# Patient Record
Sex: FEMALE | Race: OTHER | HISPANIC OR LATINO | Employment: FULL TIME | URBAN - METROPOLITAN AREA
[De-identification: names, ages, dates, MRNs, and addresses within clinical notes are randomized per-mention and may not be internally consistent; named-entity substitution may affect disease eponyms.]

---

## 2018-02-22 ENCOUNTER — TREATMENT (OUTPATIENT)
Dept: PHYSICAL THERAPY | Facility: CLINIC | Age: 28
End: 2018-02-22

## 2018-02-22 DIAGNOSIS — Z02.1 PRE-EMPLOYMENT EXAMINATION: Primary | ICD-10-CM

## 2018-02-22 PROCEDURE — PDS: Performed by: PHYSICAL THERAPIST

## 2019-10-07 ENCOUNTER — OFFICE VISIT (OUTPATIENT)
Dept: OBSTETRICS AND GYNECOLOGY | Facility: CLINIC | Age: 29
End: 2019-10-07

## 2019-10-07 VITALS
BODY MASS INDEX: 34.66 KG/M2 | SYSTOLIC BLOOD PRESSURE: 126 MMHG | DIASTOLIC BLOOD PRESSURE: 68 MMHG | HEIGHT: 69 IN | WEIGHT: 234 LBS

## 2019-10-07 DIAGNOSIS — Z12.4 SCREENING FOR CERVICAL CANCER: Primary | ICD-10-CM

## 2019-10-07 DIAGNOSIS — Z30.432 ENCOUNTER FOR IUD REMOVAL: ICD-10-CM

## 2019-10-07 PROCEDURE — 58301 REMOVE INTRAUTERINE DEVICE: CPT | Performed by: OBSTETRICS & GYNECOLOGY

## 2019-10-07 PROCEDURE — 87624 HPV HI-RISK TYP POOLED RSLT: CPT | Performed by: OBSTETRICS & GYNECOLOGY

## 2019-10-07 PROCEDURE — 99203 OFFICE O/P NEW LOW 30 MIN: CPT | Performed by: OBSTETRICS & GYNECOLOGY

## 2019-10-07 PROCEDURE — G0123 SCREEN CERV/VAG THIN LAYER: HCPCS | Performed by: OBSTETRICS & GYNECOLOGY

## 2019-10-07 RX ORDER — ALBUTEROL SULFATE 0.63 MG/3ML
1 SOLUTION RESPIRATORY (INHALATION)
COMMUNITY

## 2019-10-07 NOTE — PROGRESS NOTES
Subjective   Myrtle Hurst is a 29 y.o. female  YOB: 1990        Chief Complaint   Patient presents with   • Contraception     Pt is here for the removal of her IUD        29 year old female  LMP 10 years ago presents for removal of IUD. Patient reports that her last annual examinations was several years ago. She has had her Mirena in for five years. Reports her last PAP smear was 5 years ago and was normal as per patient. She reports occasional ETOH. She is interested in becoming pregnant. She is currently  to her wife and is interested in insemination. She reports that she has not had a menstrual cycle since having her Mirena placed. She reports occasional ETOH.           No Known Allergies    History reviewed. No pertinent past medical history.    Family History   Problem Relation Age of Onset   • Heart disease Father    • Kidney cancer Father    • Stroke Mother    • Hypertension Mother    • Colon cancer Mother    • Melanoma Mother    • No Known Problems Brother    • No Known Problems Sister    • No Known Problems Brother    • Ovarian cancer Sister        Social History     Socioeconomic History   • Marital status:      Spouse name: Not on file   • Number of children: Not on file   • Years of education: Not on file   • Highest education level: Not on file   Tobacco Use   • Smoking status: Never Smoker   • Smokeless tobacco: Never Used   Substance and Sexual Activity   • Alcohol use: Yes     Comment: occasional    • Drug use: No   • Sexual activity: Yes     Partners: Female     Birth control/protection: IUD         Current Outpatient Medications:   •  albuterol (ACCUNEB) 0.63 MG/3ML nebulizer solution, 1 ampule., Disp: , Rfl:     No LMP recorded. Patient has had an implant.    Sexual History:         Could not be calculated    Past Surgical History:   Procedure Laterality Date   • BREAST BIOPSY     • TONSILLECTOMY         Review of Systems   Constitutional: Negative for  "activity change and unexpected weight loss.   HENT: Negative for congestion.    Cardiovascular: Negative for chest pain.   Gastrointestinal: Negative for blood in stool, constipation and diarrhea.   Endocrine: Negative for cold intolerance and heat intolerance.   Genitourinary: Positive for amenorrhea (Mirena in place). Negative for dyspareunia, pelvic pain and vaginal discharge.   Musculoskeletal: Negative for arthralgias, back pain, neck pain and neck stiffness.   Skin: Negative for rash.   Neurological: Negative for dizziness and headache.   Psychiatric/Behavioral: Negative for sleep disturbance. The patient is not nervous/anxious.        Objective   Physical Exam   Constitutional: She appears well-developed and well-nourished. No distress.   HENT:   Head: Normocephalic and atraumatic.   Neck: Normal range of motion. No thyromegaly present.   Cardiovascular: Normal rate and regular rhythm.   No murmur heard.  Pulmonary/Chest: Effort normal. She has no wheezes. She has no rales.   Abdominal: Soft. There is no tenderness.   Genitourinary: Vagina normal and cervix normal. Pelvic exam was performed with patient supine. There is no tenderness or lesion on the right labia. There is no tenderness or lesion on the left labia. Uterus is not enlarged or tender. Cervix does not exhibit motion tenderness, discharge or friability. Right adnexum displays no tenderness and no fullness. Left adnexum displays no tenderness and no fullness. No tenderness or bleeding in the vagina. No signs of injury around the vagina. No vaginal discharge found.   Genitourinary Comments: Consent obtained to remove IUD at this time   Strings visible. Grasp with ringed forceps. Removed without difficulty.      Nursing note and vitals reviewed.        Vitals:    10/07/19 1413   BP: 126/68   Weight: 106 kg (234 lb)   Height: 175.3 cm (69\")       Myrtle was seen today for contraception.    Diagnoses and all orders for this visit:    Screening for " cervical cancer  -     Liquid-based Pap Smear, Screening    Encounter for IUD removal  -     Liquid-based Pap Smear, Screening    -PAP smear collected today   -IUD removed without difficulty   -Counseled patient to track cycles and RTC In 3 months to discuss cycles   -Patient interested in IUI. Given information on [facility].     Lucila Noguera, DO

## 2019-10-10 LAB
GEN CATEG CVX/VAG CYTO-IMP: NORMAL
HPV I/H RISK 4 DNA CVX QL PROBE+SIG AMP: NOT DETECTED
LAB AP CASE REPORT: NORMAL
LAB AP GYN ADDITIONAL INFORMATION: NORMAL
LAB AP GYN OTHER FINDINGS: NORMAL
PATH INTERP SPEC-IMP: NORMAL
STAT OF ADQ CVX/VAG CYTO-IMP: NORMAL

## 2020-07-10 ENCOUNTER — OFFICE VISIT (OUTPATIENT)
Dept: OBSTETRICS AND GYNECOLOGY | Facility: CLINIC | Age: 30
End: 2020-07-10

## 2020-07-10 VITALS
WEIGHT: 238 LBS | HEIGHT: 69 IN | DIASTOLIC BLOOD PRESSURE: 70 MMHG | BODY MASS INDEX: 35.25 KG/M2 | SYSTOLIC BLOOD PRESSURE: 128 MMHG

## 2020-07-10 DIAGNOSIS — Z31.9 INFERTILITY MANAGEMENT: Primary | ICD-10-CM

## 2020-07-10 PROCEDURE — 99213 OFFICE O/P EST LOW 20 MIN: CPT | Performed by: OBSTETRICS & GYNECOLOGY

## 2020-07-23 ENCOUNTER — TELEPHONE (OUTPATIENT)
Dept: OBSTETRICS AND GYNECOLOGY | Facility: CLINIC | Age: 30
End: 2020-07-23

## 2020-08-04 ENCOUNTER — PROCEDURE VISIT (OUTPATIENT)
Dept: OBSTETRICS AND GYNECOLOGY | Facility: CLINIC | Age: 30
End: 2020-08-04

## 2020-08-04 VITALS
WEIGHT: 238 LBS | BODY MASS INDEX: 35.25 KG/M2 | DIASTOLIC BLOOD PRESSURE: 78 MMHG | SYSTOLIC BLOOD PRESSURE: 124 MMHG | HEIGHT: 69 IN

## 2020-08-04 DIAGNOSIS — Z31.9 PATIENT DESIRES PREGNANCY: ICD-10-CM

## 2020-08-04 PROCEDURE — 58322 ARTIFICIAL INSEMINATION: CPT | Performed by: OBSTETRICS & GYNECOLOGY

## 2020-08-04 NOTE — PROGRESS NOTES
Myrtle Hurst is a 29 y.o. female here today for intrauterine insemination.  She was previously seen and evaluated by US on Monday morning with US and triggered with hcg.  She is on ovulation induction.  She has doesn't have a history of inferility but desires pregnancy with her wife. The have elected to use donor sperm and had it shipped to their house. This is insemination #1.    The patient was placed in the lithotomy position on the exam table.  The cervix was visualized with a speculum.  The cervix was probed and found to be patent.  I have reviewed the slide prepared from the semen washings.  There are numerous mobile sperm noted in a fairly high concentration.  The prepared semen sample, of approximately 0.5cc, was drawn up in an insemination catheter and the catheter placed transcervically to the uterine fundus.  The entire sample was placed at the uterine fundus without difficulty.  The catheter and speculum were removed.  The patient was allowed to stay in a hips elevated position for 20 minutes prior to leaving the office.  She will notify the office in a couple of weeks if she has a period or a positive pregnancy test.

## 2021-07-06 NOTE — PROGRESS NOTES
"Subjective   Myrtle Hurst is a 29 y.o. female.     Chief Complaint   Patient presents with   • Possible Pregnancy     PT is here to discuss IUI       29 year old female  Patient's last menstrual period was 2020 (exact date) presents for follow up management on infertility. Patient was previously seen and evaluated for removal of her Mirena IUD since she desired to have a baby. She has been tracking her cycles and reports that they are regular lasting approximately 5 days and the last day is light. She and her partner have looked up sperm [facility] and reports that they have a membership. She voices no other complaints at this time.          Review of Systems   Constitutional: Negative for chills and fever.   Genitourinary: Negative for menstrual problem and vaginal bleeding.       Objective   /70   Ht 175.3 cm (69\")   Wt 108 kg (238 lb)   LMP 2020 (Exact Date)   BMI 35.15 kg/m²   Patient's last menstrual period was 2020 (exact date).  Physical Exam   Constitutional: She is oriented to person, place, and time. She appears well-developed and well-nourished. No distress.   HENT:   Head: Normocephalic and atraumatic.   Eyes: Conjunctivae are normal. Right eye exhibits no discharge. Left eye exhibits no discharge.   Neck: Normal range of motion.   Pulmonary/Chest: Effort normal.   Musculoskeletal: Normal range of motion.   Neurological: She is alert and oriented to person, place, and time.   Skin: Skin is warm and dry.   Psychiatric: She has a normal mood and affect. Her behavior is normal. Judgment normal.   Nursing note and vitals reviewed.    Assessment/Plan   Problems Addressed this Visit     None      Visit Diagnoses     Infertility management    -  Primary      -Discussed risk, benefits to placing patient on Clomid at this time. Risk including increased risk for multiples.   -Discussed with patient she should take Clomid days 3-7 follwed by US at day 10 with IUI insemination 36 " hours after she has received the hcg trigger shot.   -RTC for US surveillance   -All questions answered and patient verbalized understanding of plan.        Lucila Noguera, DO   21-Jun-2021 01:50

## 2021-08-27 ENCOUNTER — OFFICE VISIT (OUTPATIENT)
Dept: OBSTETRICS AND GYNECOLOGY | Facility: CLINIC | Age: 31
End: 2021-08-27

## 2021-08-27 VITALS
DIASTOLIC BLOOD PRESSURE: 78 MMHG | BODY MASS INDEX: 32.88 KG/M2 | HEIGHT: 69 IN | SYSTOLIC BLOOD PRESSURE: 122 MMHG | WEIGHT: 222 LBS

## 2021-08-27 DIAGNOSIS — Z31.9 PATIENT DESIRES PREGNANCY: Primary | ICD-10-CM

## 2021-08-27 DIAGNOSIS — Z31.9 INFERTILITY MANAGEMENT: ICD-10-CM

## 2021-08-27 PROCEDURE — 99213 OFFICE O/P EST LOW 20 MIN: CPT | Performed by: OBSTETRICS & GYNECOLOGY

## 2021-08-27 RX ORDER — PRENATAL VIT NO.126/IRON/FOLIC 28MG-0.8MG
TABLET ORAL DAILY
COMMUNITY
End: 2022-09-27

## 2021-08-27 RX ORDER — ERGOCALCIFEROL (VITAMIN D2) 10 MCG
400 TABLET ORAL DAILY
COMMUNITY
End: 2022-09-27

## 2021-08-27 RX ORDER — CETIRIZINE HYDROCHLORIDE 10 MG/1
10 TABLET ORAL DAILY
COMMUNITY

## 2021-08-27 NOTE — PROGRESS NOTES
"Liliana Hurst is a 30 y.o. female.     Chief Complaint   Patient presents with   • Infertility     pt here to discuss IUI planning on using [facility], currently on CD 9     30-year-old female  2 para 1-0-1-1 last menstrual period 2021 presents to further discuss infertility management.  Patient previously been seen and evaluated in August of last year and underwent 1 IUI.  She had previously gotten sperm from the [facility].  She reports her cycles are regular lasting approximately 4 days.  She denies any changes to her medical or surgical history.  Her medications and allergies are up-to-date.  She voices no new concerns at this time.         Review of Systems   Genitourinary: Negative for menstrual problem and vaginal bleeding.       Objective   /78 (BP Location: Left arm, Patient Position: Sitting, Cuff Size: Adult)   Ht 175.3 cm (69\")   Wt 101 kg (222 lb)   LMP 2021 (Exact Date)   Breastfeeding No   BMI 32.78 kg/m²   Patient's last menstrual period was 2021 (exact date).  Physical Exam  Vitals and nursing note reviewed.   Constitutional:       General: She is not in acute distress.     Appearance: She is well-developed.   HENT:      Head: Normocephalic and atraumatic.   Eyes:      General:         Right eye: No discharge.         Left eye: No discharge.      Conjunctiva/sclera: Conjunctivae normal.   Pulmonary:      Effort: Pulmonary effort is normal.   Musculoskeletal:         General: Normal range of motion.      Cervical back: Normal range of motion and neck supple.   Skin:     General: Skin is warm and dry.   Neurological:      Mental Status: She is alert and oriented to person, place, and time.   Psychiatric:         Behavior: Behavior normal.         Judgment: Judgment normal.       Assessment/Plan   Problems Addressed this Visit     None      Visit Diagnoses     Patient desires pregnancy    -  Primary    Infertility management     "      Diagnoses       Codes Comments    Patient desires pregnancy    -  Primary ICD-10-CM: Z31.9  ICD-9-CM: V26.9     Infertility management     ICD-10-CM: Z31.9  ICD-9-CM: V26.9       Transvaginal ultrasound today revealed a 13 mm follicle.  We will have patient return to clinic on Monday for repeat transvaginal ultrasound.  Discussed with patient for this cycle we would be using just a trigger shot.  Discussed with patient if her future cycles we could talk about ovulation induction.  Counseled patient on risk of multiples with ovulation induction.  All questions answered patient and support person verbalized understanding of plan.       Lucila Noguera, DO

## 2021-08-30 RX ORDER — CHORIOGONADOTROPIN ALFA 250 UG/.5ML
250 INJECTION, SOLUTION SUBCUTANEOUS ONCE
Qty: 0.5 ML | Refills: 0 | Status: SHIPPED | OUTPATIENT
Start: 2021-08-30 | End: 2021-08-30

## 2021-09-01 ENCOUNTER — PROCEDURE VISIT (OUTPATIENT)
Dept: OBSTETRICS AND GYNECOLOGY | Facility: CLINIC | Age: 31
End: 2021-09-01

## 2021-09-01 VITALS
BODY MASS INDEX: 33.18 KG/M2 | HEIGHT: 69 IN | DIASTOLIC BLOOD PRESSURE: 70 MMHG | SYSTOLIC BLOOD PRESSURE: 118 MMHG | WEIGHT: 224 LBS

## 2021-09-01 DIAGNOSIS — Z31.9 INFERTILITY MANAGEMENT: ICD-10-CM

## 2021-09-01 PROCEDURE — 58323 SPERM WASHING: CPT | Performed by: OBSTETRICS & GYNECOLOGY

## 2021-09-01 PROCEDURE — 58322 ARTIFICIAL INSEMINATION: CPT | Performed by: OBSTETRICS & GYNECOLOGY

## 2021-09-01 NOTE — PROGRESS NOTES
Myrtle Hurst is a 30 y.o. female here today for intrauterine insemination.  She had a dominant follicle on Monday and took her trigger shot on Tuesday morning.  She is not on ovulation induction.  She has had her infertility workup through Dr. Noguera.  This is insemination #2. Her partner is a female.     The patient was placed in the lithotomy position on the exam table.  The cervix was visualized with a speculum.  The cervix was probed and found to be patent.  I have reviewed the slide prepared from the semen washings.  There are numerous mobile sperm noted in a high concentration.  The prepared semen sample, of approximately 0.5cc, was drawn up in an insemination catheter and the catheter placed transcervically to the uterine fundus.  The entire sample was placed at the uterine fundus without difficulty.  The catheter and speculum were removed.  The patient was allowed to stay in a hips elevated position for 20 minutes prior to leaving the office.  She will notify the office in a couple of weeks if she has a period or a positive pregnancy test.

## 2022-09-27 ENCOUNTER — TELEPHONE (OUTPATIENT)
Dept: FAMILY MEDICINE CLINIC | Facility: CLINIC | Age: 32
End: 2022-09-27

## 2022-09-27 ENCOUNTER — LAB (OUTPATIENT)
Dept: LAB | Facility: HOSPITAL | Age: 32
End: 2022-09-27

## 2022-09-27 ENCOUNTER — OFFICE VISIT (OUTPATIENT)
Dept: FAMILY MEDICINE CLINIC | Facility: CLINIC | Age: 32
End: 2022-09-27

## 2022-09-27 VITALS
HEART RATE: 51 BPM | DIASTOLIC BLOOD PRESSURE: 75 MMHG | SYSTOLIC BLOOD PRESSURE: 120 MMHG | BODY MASS INDEX: 32.88 KG/M2 | HEIGHT: 69 IN | WEIGHT: 222 LBS

## 2022-09-27 DIAGNOSIS — Z51.81 MEDICATION MONITORING ENCOUNTER: ICD-10-CM

## 2022-09-27 DIAGNOSIS — F90.0 ATTENTION DEFICIT HYPERACTIVITY DISORDER (ADHD), PREDOMINANTLY INATTENTIVE TYPE: Primary | ICD-10-CM

## 2022-09-27 DIAGNOSIS — E66.09 CLASS 1 OBESITY DUE TO EXCESS CALORIES WITHOUT SERIOUS COMORBIDITY WITH BODY MASS INDEX (BMI) OF 32.0 TO 32.9 IN ADULT: ICD-10-CM

## 2022-09-27 DIAGNOSIS — F90.0 ATTENTION DEFICIT HYPERACTIVITY DISORDER (ADHD), PREDOMINANTLY INATTENTIVE TYPE: ICD-10-CM

## 2022-09-27 PROBLEM — J30.9 ALLERGIC RHINITIS: Status: ACTIVE | Noted: 2022-09-27

## 2022-09-27 PROBLEM — E66.811 CLASS 1 OBESITY DUE TO EXCESS CALORIES WITHOUT SERIOUS COMORBIDITY WITH BODY MASS INDEX (BMI) OF 32.0 TO 32.9 IN ADULT: Status: ACTIVE | Noted: 2022-09-27

## 2022-09-27 LAB
AMPHET+METHAMPHET UR QL: NEGATIVE
AMPHETAMINES UR QL: NEGATIVE
BARBITURATES UR QL SCN: NEGATIVE
BENZODIAZ UR QL SCN: NEGATIVE
BUPRENORPHINE SERPL-MCNC: NEGATIVE NG/ML
CANNABINOIDS SERPL QL: NEGATIVE
COCAINE UR QL: NEGATIVE
METHADONE UR QL SCN: NEGATIVE
OPIATES UR QL: NEGATIVE
OXYCODONE UR QL SCN: NEGATIVE
PCP UR QL SCN: NEGATIVE
PROPOXYPH UR QL: NEGATIVE
TRICYCLICS UR QL SCN: NEGATIVE

## 2022-09-27 PROCEDURE — 80306 DRUG TEST PRSMV INSTRMNT: CPT

## 2022-09-27 PROCEDURE — 99204 OFFICE O/P NEW MOD 45 MIN: CPT | Performed by: PEDIATRICS

## 2022-09-27 RX ORDER — MULTIPLE VITAMINS W/ MINERALS TAB 9MG-400MCG
1 TAB ORAL DAILY
COMMUNITY

## 2022-09-27 NOTE — ASSESSMENT & PLAN NOTE
Evaluation packet reviewed and discussed with patient.  Patients symptoms are impairing her work performance and ability to have positive family kinetics.  We will start new medication today.  Nnamdi reviewed and is clean.  Follow up in 1 month.    I feel that her mood issues are secondary to frustration with her performance due to lack of attention and focus.    We will start her on Vyvanse 50mg with a negative UDS.    UDS negative.  We will send Vyvanse to the pharmacy.

## 2022-09-27 NOTE — PROGRESS NOTES
"Chief Complaint  ADHD (Initial evaluation/)    Subjective    History of Present Illness      Patient presents to [facility] for   History of Present Illness  Pt states that she is here for an initial ADHD evaluation.       The patient endorses symptoms of ADHD including, but not limited to:       Yes: careless mistakes or not paying attention to directions or people of authority    Yes: trouble keeping attention on tasks and during hobbies or leisure activities    No: does not listen when spoken to directly    No: does not follow instructions and fails to finish homework chores daily tasks or duties at work    Yes: trouble organizing activities    Yes: avoids dislikes or doesn't want to do things that require mental effort for a long period of time    Yes: loses things needed for tasks    Yes: easily distracted    Yes: forgetful in daily activities    Yes: often fidgets with hands or feet or squirms in seat    Yes: often is restless    Yes: often gets up from seat and moves around when remaining in seat is expected    Yes: often has trouble enjoying leisure activities quietly    No: is often on the go or often acts is if driven by a motor    Yes: often talks excessively    No: often blurts out answers before questions have been finished    No: often has trouble waiting one's turn    No: often interrupts or intrudes on others      The patient has had symptoms of ADHD for teenage years, which have worsened over the last 6 yearss.  The patient/guardian rates their ADHD at a 7/10 on a 0-10 scale, with 10 being the worst.       Review of Systems    I have reviewed and agree with the HPI information as above.  Javan Parker MD     Objective   Vital Signs:   /75   Pulse 51   Ht 175.3 cm (69\")   Wt 101 kg (222 lb)   BMI 32.78 kg/m²     BMI is >= 30 and <35. (Class 1 Obesity). The following options were offered after discussion;: exercise counseling/recommendations and nutrition " counseling/recommendations      Physical Exam  Vitals and nursing note reviewed.   Constitutional:       Appearance: Normal appearance. She is well-developed. She is obese.   HENT:      Head: Normocephalic and atraumatic.      Right Ear: External ear normal.      Left Ear: External ear normal.      Nose: Nose normal. No nasal tenderness or congestion.      Mouth/Throat:      Lips: Pink. No lesions.      Mouth: Mucous membranes are moist. No oral lesions.      Dentition: Normal dentition.      Pharynx: Oropharynx is clear. No pharyngeal swelling, oropharyngeal exudate or posterior oropharyngeal erythema.   Eyes:      General: Lids are normal. Vision grossly intact. No scleral icterus.        Right eye: No discharge.         Left eye: No discharge.      Extraocular Movements: Extraocular movements intact.      Conjunctiva/sclera: Conjunctivae normal.      Right eye: Right conjunctiva is not injected.      Left eye: Left conjunctiva is not injected.      Pupils: Pupils are equal, round, and reactive to light.   Cardiovascular:      Rate and Rhythm: Normal rate and regular rhythm.      Heart sounds: Normal heart sounds. No murmur heard.    No gallop.   Pulmonary:      Effort: Pulmonary effort is normal.      Breath sounds: Normal breath sounds and air entry. No wheezing, rhonchi or rales.   Musculoskeletal:         General: No tenderness or deformity. Normal range of motion.      Cervical back: Full passive range of motion without pain, normal range of motion and neck supple.      Right lower leg: No edema.      Left lower leg: No edema.   Skin:     General: Skin is warm and dry.      Coloration: Skin is not jaundiced.      Findings: No rash.   Neurological:      Mental Status: She is alert and oriented to person, place, and time.      Cranial Nerves: Cranial nerves are intact.      Sensory: Sensation is intact.      Motor: Motor function is intact.      Coordination: Coordination is intact.      Gait: Gait is intact.    Psychiatric:         Attention and Perception: Attention normal.         Mood and Affect: Mood and affect normal.         Behavior: Behavior is not hyperactive. Behavior is cooperative.         Thought Content: Thought content normal.         Judgment: Judgment normal.          CHRISTOFER-7: Over the last two weeks, how often have you been bothered by the following problems?  Feeling nervous, anxious or on edge: Not at all  Not being able to stop or control worrying: Not at all  Worrying too much about different things: Not at all  Trouble Relaxing: Nearly every day  Being so restless that it is hard to sit still: Not at all  Becoming easily annoyed or irritable: Not at all  Feeling afraid as if something awful might happen: Not at all  CHRISTOFER 7 Total Score: 3  If you checked any problems, how difficult have these problems made it for you to do your work, take care of things at home, or get along with other people: Not difficult at all    PHQ-2 Depression Screening  Little interest or pleasure in doing things? 0-->not at all   Feeling down, depressed, or hopeless? 0-->not at all   PHQ-2 Total Score 0     PHQ-9 Depression Screening  Little interest or pleasure in doing things? 0-->not at all   Feeling down, depressed, or hopeless? 0-->not at all   Trouble falling or staying asleep, or sleeping too much?     Feeling tired or having little energy?     Poor appetite or overeating?     Feeling bad about yourself - or that you are a failure or have let yourself or your family down?     Trouble concentrating on things, such as reading the newspaper or watching television?     Moving or speaking so slowly that other people could have noticed? Or the opposite - being so fidgety or restless that you have been moving around a lot more than usual?     Thoughts that you would be better off dead, or of hurting yourself in some way?     PHQ-9 Total Score 0   If you checked off any problems, how difficult have these problems made it for  you to do your work, take care of things at home, or get along with other people?        Result Review  Data Reviewed:     Urine Drug Screen - Urine, Clean Catch (09/27/2022 10:25)         Assessment and Plan      Problem List Items Addressed This Visit        Endocrine and Metabolic    Class 1 obesity due to excess calories without serious comorbidity with body mass index (BMI) of 32.0 to 32.9 in adult    Current Assessment & Plan     Patient's (Body mass index is 32.78 kg/m².) indicates that they are obese (BMI >30) with health conditions that include none . Weight is newly identified. BMI is is above average; BMI management plan is completed. We discussed portion control and increasing exercise.             Health Encounters    Medication monitoring encounter    Relevant Orders    Urine Drug Screen - Urine, Clean Catch (Completed)       Mental Health    Attention deficit hyperactivity disorder (ADHD), predominantly inattentive type - Primary    Current Assessment & Plan     Evaluation packet reviewed and discussed with patient.  Patients symptoms are impairing her work performance and ability to have positive family kinetics.  We will start new medication today.  Nnamdi reviewed and is clean.  Follow up in 1 month.    I feel that her mood issues are secondary to frustration with her performance due to lack of attention and focus.    We will start her on Vyvanse 50mg with a negative UDS.    UDS negative.  We will send Vyvanse to the pharmacy.         Relevant Medications    lisdexamfetamine (Vyvanse) 50 MG capsule    Other Relevant Orders    Urine Drug Screen - Urine, Clean Catch (Completed)              Follow Up   Return in about 4 weeks (around 10/25/2022) for Recheck.  Patient was given instructions and counseling regarding her condition or for health maintenance advice. Please see specific information pulled into the AVS if appropriate.       Answers for HPI/ROS submitted by the patient on 9/20/2022  Please  describe your symptoms.: Screening for ADHD  Have you had these symptoms before?: Yes  How long have you been having these symptoms?: Greater than 2 weeks  Please list any medications you are currently taking for this condition.: None  What is the primary reason for your visit?: Other

## 2022-09-27 NOTE — TELEPHONE ENCOUNTER
Caller: Myrtle Hurst    Relationship: Self    Best call back number: [phone]        Who are you requesting to speak with (clinical staff, provider,  specific staff member): CLINICAL STAFF    Do you know the name of the person who called: PATIENT     What was the call regarding: PRIOR AUTHORIZATION FOR VYVANSE 50 MG CAPSULE    Do you require a callback: YES

## 2022-09-27 NOTE — ASSESSMENT & PLAN NOTE
Patient's (Body mass index is 32.78 kg/m².) indicates that they are obese (BMI >30) with health conditions that include none . Weight is newly identified. BMI is is above average; BMI management plan is completed. We discussed portion control and increasing exercise.

## 2022-09-29 ENCOUNTER — TELEPHONE (OUTPATIENT)
Dept: FAMILY MEDICINE CLINIC | Facility: CLINIC | Age: 32
End: 2022-09-29

## 2022-09-29 NOTE — TELEPHONE ENCOUNTER
Submitted pa via covermymeds for pt's vyvanse. Awaiting determination. Upon submitting, received the following from pt's insurance:     Vyvanse 50MG capsules Required  Amphetamine-Dextroamphet ER Not Required  Amphetamine-Dextroamphetamine Not Required  Atomoxetine HCl Not Required  Dexmethylphenidate HCl ER Not Required  GuanFACINE HCl ER Not Required  Methylphenidate HCl Not Required  Methylphenidate HCl ER (OSM) Required    Contacted pt back, verified name and . Advised insurance would most likely deny if no prev tried and failed but would contact back once received. Pt vu of all and thanked staff.

## 2022-10-03 ENCOUNTER — TELEPHONE (OUTPATIENT)
Dept: FAMILY MEDICINE CLINIC | Facility: CLINIC | Age: 32
End: 2022-10-03

## 2022-10-03 NOTE — TELEPHONE ENCOUNTER
Caller: Myrtle Hurst    Relationship to patient: Self    Best call back number: [phone]    Patient is needing: PT is asking for something else to be prescribed instead of Vyvanse b/c of PA, she said that her insurance wants her to try adderall or something else.     She would like a call w/ update on PA

## 2022-10-04 RX ORDER — DEXTROAMPHETAMINE SACCHARATE, AMPHETAMINE ASPARTATE MONOHYDRATE, DEXTROAMPHETAMINE SULFATE AND AMPHETAMINE SULFATE 5; 5; 5; 5 MG/1; MG/1; MG/1; MG/1
20 CAPSULE, EXTENDED RELEASE ORAL EVERY MORNING
Qty: 30 CAPSULE | Refills: 0 | Status: SHIPPED | OUTPATIENT
Start: 2022-10-04 | End: 2022-10-26 | Stop reason: SINTOL

## 2022-10-26 ENCOUNTER — OFFICE VISIT (OUTPATIENT)
Dept: FAMILY MEDICINE CLINIC | Facility: CLINIC | Age: 32
End: 2022-10-26

## 2022-10-26 VITALS
HEART RATE: 82 BPM | DIASTOLIC BLOOD PRESSURE: 72 MMHG | SYSTOLIC BLOOD PRESSURE: 106 MMHG | BODY MASS INDEX: 31.1 KG/M2 | HEIGHT: 69 IN | WEIGHT: 210 LBS | OXYGEN SATURATION: 99 %

## 2022-10-26 DIAGNOSIS — Z51.81 MEDICATION MONITORING ENCOUNTER: ICD-10-CM

## 2022-10-26 DIAGNOSIS — F90.0 ATTENTION DEFICIT HYPERACTIVITY DISORDER (ADHD), PREDOMINANTLY INATTENTIVE TYPE: Primary | ICD-10-CM

## 2022-10-26 PROCEDURE — 99214 OFFICE O/P EST MOD 30 MIN: CPT | Performed by: PEDIATRICS

## 2022-10-26 NOTE — PROGRESS NOTES
"Chief Complaint  ADHD    Subjective    History of Present Illness      Patient presents to [facility] for   History of Present Illness  Pt is here today for 1 month ADHD f/u.   Pt reports occasional Tachycardia since beginning the Adderall XR with pulse rate getting into the low 100's. Pt states this has led to some minor asthma problems.  Pt states she feels like she is only getting a good 6-7 hours out of her Adderall before it is wearing off.      ADHD/Mood HPI    Visit for:  follow-up. Most recent visit was 1 month ago.  Interim changes to follow up on today: medication dose change  Work/School Performance:  improving  Cognitive:  able to focus    Behavior  Hyperactivity: is not hyperactive  Impulsivity: no impulsivity and no unsafe behavior  Tasking: able to initiate tasks, able to complete tasks and able to mult-task    Social  ADHD social/impulsive symptoms:  has difficulty awaiting turn, does not blurt out inappropriate comments and excessive talking    Behavioral health  Behavior: no concerns  Emotional coping: demonstrates feelings of no concerns       Review of Systems    I have reviewed and agree with the HPI information as above.  Javan Parker MD     Objective   Vital Signs:   /72 (BP Location: Right arm, Patient Position: Sitting, Cuff Size: Adult)   Pulse 82   Ht 175.3 cm (69\")   Wt 95.3 kg (210 lb)   SpO2 99%   BMI 31.01 kg/m²     BMI is >= 30 and <35. (Class 1 Obesity). The following options were offered after discussion;: nutrition counseling/recommendations      Physical Exam  Constitutional:       Appearance: Normal appearance. She is normal weight.   Cardiovascular:      Rate and Rhythm: Normal rate and regular rhythm.      Heart sounds: Normal heart sounds.      Comments: Normal hr today  Pulmonary:      Effort: Pulmonary effort is normal.      Breath sounds: Normal breath sounds.   Neurological:      Mental Status: She is alert.   Psychiatric:         " Mood and Affect: Mood normal.         Behavior: Behavior normal.          CHRISTOFER-7:      PHQ-2 Depression Screening  Little interest or pleasure in doing things? 0-->not at all   Feeling down, depressed, or hopeless? 0-->not at all   PHQ-2 Total Score 0     PHQ-9 Depression Screening  Little interest or pleasure in doing things? 0-->not at all   Feeling down, depressed, or hopeless? 0-->not at all   Trouble falling or staying asleep, or sleeping too much?     Feeling tired or having little energy?     Poor appetite or overeating?     Feeling bad about yourself - or that you are a failure or have let yourself or your family down?     Trouble concentrating on things, such as reading the newspaper or watching television?     Moving or speaking so slowly that other people could have noticed? Or the opposite - being so fidgety or restless that you have been moving around a lot more than usual?     Thoughts that you would be better off dead, or of hurting yourself in some way?     PHQ-9 Total Score 0   If you checked off any problems, how difficult have these problems made it for you to do your work, take care of things at home, or get along with other people?        Result Review  Data Reviewed:                   Assessment and Plan      Problem List Items Addressed This Visit        Health Encounters    Medication monitoring encounter    Current Assessment & Plan     Will change med due to side effect and lack of efficacy            Mental Health    Attention deficit hyperactivity disorder (ADHD), predominantly inattentive type - Primary    Current Assessment & Plan       Adderall seems to be causing some tachycardia to 110 and loses effectiveness after 6 hours.  Will retry to prescribe vyvanse 40         Relevant Medications    lisdexamfetamine (Vyvanse) 40 MG capsule           Follow Up   No follow-ups on file.  Patient was given instructions and counseling regarding her condition or for health maintenance advice. Please  see specific information pulled into the AVS if appropriate.       Answers for HPI/ROS submitted by the patient on 10/25/2022  Please describe your symptoms.: One month check up after being prescribed Adderall XR for a recent ADHD diagnosis  Have you had these symptoms before?: Yes  How long have you been having these symptoms?: Greater than 2 weeks  Please list any medications you are currently taking for this condition.: Adderall XR  What is the primary reason for your visit?: Other

## 2022-10-26 NOTE — ASSESSMENT & PLAN NOTE
Adderall seems to be causing some tachycardia to 110 and loses effectiveness after 6 hours.  Will retry to prescribe vyvanse 40

## 2022-11-02 ENCOUNTER — TELEPHONE (OUTPATIENT)
Dept: FAMILY MEDICINE CLINIC | Facility: CLINIC | Age: 32
End: 2022-11-02

## 2022-11-02 NOTE — TELEPHONE ENCOUNTER
Caller: Myrtle Hurst    Relationship: Self    Best call back number: [phone]    What is the best time to reach you: SOON PLEASE     Who are you requesting to speak with (clinical staff, provider,  specific staff member):PROVIDER OR CLINICAL STAFF        What was the call regarding: PATIENT REQUESTING A CALL BACK TO CHECK ON THE NEXT STEPS TO GET HER MEDICATION  VYVANSE PRIOR AUTHORIZED OR DID PROVIDER WANT TO TRY THE CONCERTA     Do you require a callback: YES

## 2022-11-02 NOTE — TELEPHONE ENCOUNTER
Submitted pa via covermymeds for pt's vyvanse. Awaiting determination. Contacted pt back, verified name and . Advised would contact back when received. Pt vu and thanked staff.

## 2022-11-04 ENCOUNTER — TELEPHONE (OUTPATIENT)
Dept: FAMILY MEDICINE CLINIC | Facility: CLINIC | Age: 32
End: 2022-11-04

## 2022-11-04 DIAGNOSIS — F90.0 ATTENTION DEFICIT HYPERACTIVITY DISORDER (ADHD), PREDOMINANTLY INATTENTIVE TYPE: Primary | ICD-10-CM

## 2022-11-04 NOTE — TELEPHONE ENCOUNTER
Caller: Myrtle Hurst    Relationship to patient: Self    Best call back number: [phone]    Patient is needing: PATIENT CALLED TO CHECK THE STATUS OF PRIOR AUTHORIZATION FOR Rx.     PLEASE CONTACT PATIENT AND ADVISE

## 2022-11-07 NOTE — TELEPHONE ENCOUNTER
Caller: Alfred Hurst    Relationship: Self    Best call back number: [phone]    What is the best time to reach you:   ANYTIME    Who are you requesting to speak with (clinical staff, provider,  specific staff member):   [facility]    Do you know the name of the person who called:   ALFRED HURST    What was the call regarding:   PATIENT WAS CALLING TO CHECK STATUS OF PRIOR AUTHORIZATION FOR Rx.     PATIENT IS OUT OF MEDICATION.     PATIENT IS NOW IN [place] AND WOULD NEED TO HAVE IT SENT TO PHARMACY IN [place].     Do you require a callback:   YES

## 2022-11-10 NOTE — TELEPHONE ENCOUNTER
Received denial, in media, for pt's vyvanse. Insurance requires of focalin xr, ritlan la, apensio xr, or concerta er. Attempted to contact pt back to inform, no answer, left vm.

## 2022-11-14 RX ORDER — METHYLPHENIDATE HYDROCHLORIDE 36 MG/1
36 TABLET ORAL EVERY MORNING
Qty: 30 TABLET | Refills: 0 | Status: SHIPPED | OUTPATIENT
Start: 2022-11-14 | End: 2022-12-06

## 2022-12-06 ENCOUNTER — OFFICE VISIT (OUTPATIENT)
Dept: FAMILY MEDICINE CLINIC | Facility: CLINIC | Age: 32
End: 2022-12-06

## 2022-12-06 VITALS
OXYGEN SATURATION: 97 % | HEIGHT: 69 IN | SYSTOLIC BLOOD PRESSURE: 122 MMHG | WEIGHT: 200 LBS | HEART RATE: 92 BPM | BODY MASS INDEX: 29.62 KG/M2 | DIASTOLIC BLOOD PRESSURE: 84 MMHG | RESPIRATION RATE: 18 BRPM | TEMPERATURE: 98.4 F

## 2022-12-06 DIAGNOSIS — E66.3 OVERWEIGHT (BMI 25.0-29.9): ICD-10-CM

## 2022-12-06 DIAGNOSIS — F90.0 ATTENTION DEFICIT HYPERACTIVITY DISORDER (ADHD), PREDOMINANTLY INATTENTIVE TYPE: Primary | ICD-10-CM

## 2022-12-06 PROCEDURE — 99214 OFFICE O/P EST MOD 30 MIN: CPT | Performed by: NURSE PRACTITIONER

## 2022-12-06 RX ORDER — DEXMETHYLPHENIDATE HYDROCHLORIDE 10 MG/1
10 CAPSULE, EXTENDED RELEASE ORAL DAILY
Qty: 30 CAPSULE | Refills: 0 | Status: SHIPPED | OUTPATIENT
Start: 2022-12-06 | End: 2023-02-08

## 2022-12-06 NOTE — PROGRESS NOTES
"Chief Complaint  ADHD (Pt here to see what options she has for ADHD.)    Subjective        Myrtle Hurst presents to [facility]  History of Present Illness  ADHD Pt here to see what options she has for ADHD.          Objective   Vital Signs:  /84 (BP Location: Right arm, Patient Position: Sitting, Cuff Size: Adult)   Pulse 92   Temp 98.4 °F (36.9 °C)   Resp 18   Ht 175.3 cm (69\")   Wt 90.7 kg (200 lb)   SpO2 97%   BMI 29.53 kg/m²   Estimated body mass index is 29.53 kg/m² as calculated from the following:    Height as of this encounter: 175.3 cm (69\").    Weight as of this encounter: 90.7 kg (200 lb).    BMI is >= 25 and <30. (Overweight) The following options were offered after discussion;: weight loss educational material (shared in after visit summary)      Physical Exam  Constitutional:       Appearance: Normal appearance. She is well-developed.      Comments: overweight   HENT:      Head: Normocephalic and atraumatic.      Right Ear: External ear normal.      Left Ear: External ear normal.      Nose: Nose normal. No nasal tenderness or congestion.      Mouth/Throat:      Lips: Pink. No lesions.      Mouth: Mucous membranes are moist. No oral lesions.      Dentition: Normal dentition.      Pharynx: Oropharynx is clear. No pharyngeal swelling, oropharyngeal exudate or posterior oropharyngeal erythema.   Eyes:      General: Lids are normal. Vision grossly intact. No scleral icterus.        Right eye: No discharge.         Left eye: No discharge.      Extraocular Movements: Extraocular movements intact.      Conjunctiva/sclera: Conjunctivae normal.      Right eye: Right conjunctiva is not injected.      Left eye: Left conjunctiva is not injected.      Pupils: Pupils are equal, round, and reactive to light.   Cardiovascular:      Rate and Rhythm: Normal rate and regular rhythm.      Heart sounds: Normal heart sounds. No murmur heard.    No gallop.   Pulmonary:      " Effort: Pulmonary effort is normal.      Breath sounds: Normal breath sounds and air entry. No wheezing, rhonchi or rales.   Musculoskeletal:         General: No tenderness or deformity. Normal range of motion.      Cervical back: Full passive range of motion without pain, normal range of motion and neck supple.      Right lower leg: No edema.      Left lower leg: No edema.   Skin:     General: Skin is warm and dry.      Coloration: Skin is not jaundiced.      Findings: No rash.   Neurological:      Mental Status: She is alert and oriented to person, place, and time.      Cranial Nerves: Cranial nerves are intact.      Sensory: Sensation is intact.      Motor: Motor function is intact.      Coordination: Coordination is intact.      Gait: Gait is intact.   Psychiatric:         Attention and Perception: Attention normal.         Mood and Affect: Mood and affect normal.         Behavior: Behavior is not hyperactive. Behavior is cooperative.         Thought Content: Thought content normal.         Judgment: Judgment normal.        Result Review :  The following data was reviewed by: KEIKO Hebert on 12/06/2022:  Urine Drug Screen - Urine, Clean Catch (09/27/2022 10:25)       Assessment and Plan   Diagnoses and all orders for this visit:    1. Attention deficit hyperactivity disorder (ADHD), predominantly inattentive type (Primary)    2. Overweight (BMI 25.0-29.9)      Patient here today for a 1 month ADHD follow-up.  She was started on Concerta 36 mg last month.  She she has not seen a difference for improvements in her focus symptoms.  She states that it felt like she was not taking anything at all.  She has tried and failed Adderall XR.  We will proceed with trying Focalin XR 10 mg.  Nnamdi is clean.  UDS is up-to-date and appropriate.  Follow-up 1 month.  If patient does not do well with Focalin XR, we will try to get Vyvanse approved at that time.      ADHD Follow up:    PDMP reviewed and is clean. ADRS  (Adult ADHD Assessment Form) reviewed in detail, scanned in chart, and has been reviewed at time of appointment.  All questions, including medication and side effects, were discussed in detail at time of patient's visit. Patient will begin new medication which was discussed at today's visit. Patient is to return in 1 month(s).    Last Urine Toxicity     LAST URINE TOXICITY RESULTS Latest Ref Rng & Units 9/27/2022    AMPHETAMINES SCREEN, URINE Negative Negative    BARBITURATES SCREEN Negative Negative    BENZODIAZEPINE SCREEN, URINE Negative Negative    BUPRENORPHINEUR Negative Negative    COCAINE SCREEN, URINE Negative Negative    METHADONE SCREEN, URINE Negative Negative    METHAMPHETAMINEUR Negative Negative           Urine Drug Screen Results: UDS RESULTS: Current results appropriate      Follow Up   Return in about 1 month (around 1/6/2023) for Recheck.  Patient was given instructions and counseling regarding her condition or for health maintenance advice. Please see specific information pulled into the AVS if appropriate.       Answers for HPI/ROS submitted by the patient on 12/6/2022  Please describe your symptoms.: 2 month ADHD Check up  Have you had these symptoms before?: Yes  How long have you been having these symptoms?: Greater than 2 weeks  Please list any medications you are currently taking for this condition.: Concerta  What is the primary reason for your visit?: Other

## 2023-01-06 ENCOUNTER — OFFICE VISIT (OUTPATIENT)
Dept: FAMILY MEDICINE CLINIC | Facility: CLINIC | Age: 33
End: 2023-01-06
Payer: COMMERCIAL

## 2023-01-06 ENCOUNTER — TELEPHONE (OUTPATIENT)
Dept: FAMILY MEDICINE CLINIC | Facility: CLINIC | Age: 33
End: 2023-01-06
Payer: COMMERCIAL

## 2023-01-06 VITALS
HEIGHT: 69 IN | DIASTOLIC BLOOD PRESSURE: 80 MMHG | WEIGHT: 191 LBS | BODY MASS INDEX: 28.29 KG/M2 | HEART RATE: 84 BPM | SYSTOLIC BLOOD PRESSURE: 111 MMHG

## 2023-01-06 DIAGNOSIS — F90.0 ATTENTION DEFICIT HYPERACTIVITY DISORDER (ADHD), PREDOMINANTLY INATTENTIVE TYPE: ICD-10-CM

## 2023-01-06 DIAGNOSIS — F90.2 ATTENTION DEFICIT HYPERACTIVITY DISORDER (ADHD), COMBINED TYPE: Primary | ICD-10-CM

## 2023-01-06 DIAGNOSIS — F33.0 MILD EPISODE OF RECURRENT MAJOR DEPRESSIVE DISORDER: Primary | ICD-10-CM

## 2023-01-06 PROCEDURE — 99214 OFFICE O/P EST MOD 30 MIN: CPT | Performed by: NURSE PRACTITIONER

## 2023-01-06 RX ORDER — ARIPIPRAZOLE 5 MG/1
5 TABLET ORAL DAILY
Qty: 30 TABLET | Refills: 1 | Status: SHIPPED | OUTPATIENT
Start: 2023-01-06 | End: 2023-02-08 | Stop reason: SDUPTHER

## 2023-01-06 NOTE — PROGRESS NOTES
Chief Complaint  ADHD    Subjective    History of Present Illness      Patient presents to [facility] for   History of Present Illness  Pt states that she is here for a one month f/u with ADHD and says the Focalin is not working for her. Pt states that she continue to have trouble focusing.       ADHD/Mood HPI    Visit for:  follow-up. Most recent visit was 1 month ago.  Interim changes to follow up on today: no change in medication  Work/School Performance:  struggling  Cognitive:  unable to focus    Behavior  Hyperactivity: is not hyperactive  Impulsivity: impulsive  Tasking: difficulty initiating tasks and difficulty completing tasks    Social  ADHD social/impulsive symptoms:  not impatient    Behavioral health  Behavior: no concerns  Emotional coping: demonstrates feelings of no concerns       Review of Systems    I have reviewed and agree with the HPI and ROS information as above.  Raine Gordillo, KEIKO     Objective   Vital Signs:   /80   Pulse 84   Ht 175.3 cm (69\")   Wt 86.6 kg (191 lb)   BMI 28.21 kg/m²     BMI is >= 25 and <30. (Overweight) The following options were offered after discussion;: weight loss educational material (shared in after visit summary)      Physical Exam  Constitutional:       Appearance: Normal appearance. She is well-developed.   HENT:      Head: Normocephalic and atraumatic.      Right Ear: External ear normal.      Left Ear: External ear normal.      Nose: Nose normal. No nasal tenderness or congestion.      Mouth/Throat:      Lips: Pink. No lesions.      Mouth: Mucous membranes are moist. No oral lesions.      Dentition: Normal dentition.      Pharynx: Oropharynx is clear. No pharyngeal swelling, oropharyngeal exudate or posterior oropharyngeal erythema.   Eyes:      General: Lids are normal. Vision grossly intact. No scleral icterus.        Right eye: No discharge.         Left eye: No discharge.      Extraocular Movements:  Extraocular movements intact.      Conjunctiva/sclera: Conjunctivae normal.      Right eye: Right conjunctiva is not injected.      Left eye: Left conjunctiva is not injected.      Pupils: Pupils are equal, round, and reactive to light.   Cardiovascular:      Rate and Rhythm: Normal rate and regular rhythm.      Heart sounds: Normal heart sounds. No murmur heard.    No gallop.   Pulmonary:      Effort: Pulmonary effort is normal.      Breath sounds: Normal breath sounds and air entry. No wheezing, rhonchi or rales.   Musculoskeletal:         General: No tenderness or deformity. Normal range of motion.      Cervical back: Full passive range of motion without pain, normal range of motion and neck supple.      Right lower leg: No edema.      Left lower leg: No edema.   Skin:     General: Skin is warm and dry.      Coloration: Skin is not jaundiced.      Findings: No rash.   Neurological:      Mental Status: She is alert and oriented to person, place, and time.      Sensory: Sensation is intact.      Motor: Motor function is intact.      Coordination: Coordination is intact.      Gait: Gait is intact.   Psychiatric:         Attention and Perception: Attention normal.         Mood and Affect: Mood and affect normal.         Behavior: Behavior is not hyperactive. Behavior is cooperative.         Thought Content: Thought content normal.         Judgment: Judgment normal.            Result Review  Data Reviewed:                   Assessment and Plan      Diagnoses and all orders for this visit:    1. Mild episode of recurrent major depressive disorder (HCC) (Primary)  -     ARIPiprazole (Abilify) 5 MG tablet; Take 1 tablet by mouth Daily.  Dispense: 30 tablet; Refill: 1    2. Attention deficit hyperactivity disorder (ADHD), predominantly inattentive type    Patient comes in today to follow-up on ADD.  She did not fill like she was taking anything when she was taken the Focalin 10.  I did discuss this was a low dose.  She  however has tried and failed Concerta and Adderall.  Next plan was to see if Vyvanse will be covered due to failing these medicines.  She wishes to proceed that route.    Patient also mentioned that she has issues with crying on a daily basis.  She feels like it is depression related.  She denies suicidal or homicidal thoughts or plans.  She does have a strong family history of bipolar disorder.  In the past she has had anger issues but she feels like that those are better currently.  She does admit to when she was younger having suicidal thoughts.  We will proceed with trying Abilify.  I did recommend that she get this in her system before she starts on the stimulant.  She voices understanding.  We will follow-up in 1 month or sooner with worsening symptoms.  Drug screen is up-to-date.  Nnamdi is clean.        Follow Up   Return in about 4 weeks (around 2/3/2023).  Patient was given instructions and counseling regarding her condition or for health maintenance advice. Please see specific information pulled into the AVS if appropriate.

## 2023-01-06 NOTE — TELEPHONE ENCOUNTER
Received approval for pt's vyvanse for 23-24. Contacted pt, verified name and . Informed of approval. Pt vu and thanked staff.

## 2023-02-07 DIAGNOSIS — F90.2 ATTENTION DEFICIT HYPERACTIVITY DISORDER (ADHD), COMBINED TYPE: ICD-10-CM

## 2023-02-08 ENCOUNTER — OFFICE VISIT (OUTPATIENT)
Dept: FAMILY MEDICINE CLINIC | Facility: CLINIC | Age: 33
End: 2023-02-08
Payer: COMMERCIAL

## 2023-02-08 VITALS
HEIGHT: 69 IN | BODY MASS INDEX: 27.25 KG/M2 | WEIGHT: 184 LBS | SYSTOLIC BLOOD PRESSURE: 115 MMHG | DIASTOLIC BLOOD PRESSURE: 69 MMHG | HEART RATE: 80 BPM

## 2023-02-08 DIAGNOSIS — F90.2 ATTENTION DEFICIT HYPERACTIVITY DISORDER (ADHD), COMBINED TYPE: Primary | ICD-10-CM

## 2023-02-08 DIAGNOSIS — E66.3 OVERWEIGHT (BMI 25.0-29.9): ICD-10-CM

## 2023-02-08 DIAGNOSIS — F90.0 ATTENTION DEFICIT HYPERACTIVITY DISORDER (ADHD), PREDOMINANTLY INATTENTIVE TYPE: Primary | ICD-10-CM

## 2023-02-08 DIAGNOSIS — F33.0 MILD EPISODE OF RECURRENT MAJOR DEPRESSIVE DISORDER: ICD-10-CM

## 2023-02-08 DIAGNOSIS — F41.9 ANXIETY: ICD-10-CM

## 2023-02-08 PROCEDURE — 99214 OFFICE O/P EST MOD 30 MIN: CPT | Performed by: NURSE PRACTITIONER

## 2023-02-08 RX ORDER — ARIPIPRAZOLE 5 MG/1
5 TABLET ORAL DAILY
Qty: 30 TABLET | Refills: 2 | Status: SHIPPED | OUTPATIENT
Start: 2023-02-08

## 2023-02-08 RX ORDER — BUSPIRONE HYDROCHLORIDE 10 MG/1
10 TABLET ORAL 3 TIMES DAILY PRN
Qty: 90 TABLET | Refills: 2 | Status: SHIPPED | OUTPATIENT
Start: 2023-02-08

## 2023-02-08 NOTE — PROGRESS NOTES
"Chief Complaint  Depression and ADHD    Subjective    History of Present Illness      Patient presents to [facility] for   History of Present Illness  Pt states that she is here for a f/u with ADHD and depression. Pt states that the Vyvanse is working well for her.       ADHD/Mood HPI    Visit for:  follow-up. Most recent visit was 1 month ago.  Interim changes to follow up on today: no change in medication  Work/School Performance:  going well  Cognitive:  unable to focus    Behavior  Hyperactivity: is not hyperactive  Impulsivity: no impulsivity  Tasking: able to mult-task    Social  ADHD social/impulsive symptoms:  not impatient    Behavioral health  Behavior: no concerns  Emotional coping: demonstrates feelings of no concerns  Depression  Visit Type: follow-up  Sleep quality: poor           Review of Systems   Constitutional: Negative.    HENT: Negative.    Eyes: Negative.    Respiratory: Negative.    Cardiovascular: Negative.    Gastrointestinal: Negative.    Endocrine: Negative.    Genitourinary: Negative.    Musculoskeletal: Negative.    Skin: Negative.    Allergic/Immunologic: Negative.    Neurological: Negative.    Hematological: Negative.    Psychiatric/Behavioral: Negative.        I have reviewed and agree with the HPI and ROS information as above.  Mellisa Grove, APRN     Objective   Vital Signs:   /69   Pulse 80   Ht 175.3 cm (69\")   Wt 83.5 kg (184 lb)   BMI 27.17 kg/m²     BMI is >= 25 and <30. (Overweight) The following options were offered after discussion;: weight loss educational material (shared in after visit summary)      Physical Exam  Constitutional:       Appearance: Normal appearance. She is well-developed.      Comments: overweight   HENT:      Head: Normocephalic and atraumatic.      Right Ear: External ear normal.      Left Ear: External ear normal.      Nose: Nose normal. No nasal tenderness or congestion.      Mouth/Throat:      Lips: Pink. " No lesions.      Mouth: Mucous membranes are moist. No oral lesions.      Dentition: Normal dentition.      Pharynx: Oropharynx is clear. No pharyngeal swelling, oropharyngeal exudate or posterior oropharyngeal erythema.   Eyes:      General: Lids are normal. Vision grossly intact. No scleral icterus.        Right eye: No discharge.         Left eye: No discharge.      Extraocular Movements: Extraocular movements intact.      Conjunctiva/sclera: Conjunctivae normal.      Right eye: Right conjunctiva is not injected.      Left eye: Left conjunctiva is not injected.      Pupils: Pupils are equal, round, and reactive to light.   Cardiovascular:      Rate and Rhythm: Normal rate and regular rhythm.      Heart sounds: Normal heart sounds. No murmur heard.    No gallop.   Pulmonary:      Effort: Pulmonary effort is normal.      Breath sounds: Normal breath sounds and air entry. No wheezing, rhonchi or rales.   Musculoskeletal:         General: No tenderness or deformity. Normal range of motion.      Cervical back: Full passive range of motion without pain, normal range of motion and neck supple.      Right lower leg: No edema.      Left lower leg: No edema.   Skin:     General: Skin is warm and dry.      Coloration: Skin is not jaundiced.      Findings: No rash.   Neurological:      Mental Status: She is alert and oriented to person, place, and time.      Sensory: Sensation is intact.      Motor: Motor function is intact.      Coordination: Coordination is intact.      Gait: Gait is intact.   Psychiatric:         Attention and Perception: Attention normal.         Mood and Affect: Mood and affect normal.         Behavior: Behavior is not hyperactive. Behavior is cooperative.         Thought Content: Thought content normal.         Judgment: Judgment normal.          CHRISTOFER-7: Over the last two weeks, how often have you been bothered by the following problems?  Feeling nervous, anxious or on edge: Not at all  Not being able  to stop or control worrying: More than half the days  Worrying too much about different things: Nearly every day  Trouble Relaxing: Nearly every day  Being so restless that it is hard to sit still: Not at all  Becoming easily annoyed or irritable: Not at all  Feeling afraid as if something awful might happen: Not at all  CHRISTOFER 7 Total Score: 8  If you checked any problems, how difficult have these problems made it for you to do your work, take care of things at home, or get along with other people: Somewhat difficult    PHQ-2 Depression Screening  Little interest or pleasure in doing things? 3-->nearly every day   Feeling down, depressed, or hopeless? 3-->nearly every day   PHQ-2 Total Score 14     PHQ-9 Depression Screening  Little interest or pleasure in doing things? 3-->nearly every day   Feeling down, depressed, or hopeless? 3-->nearly every day   Trouble falling or staying asleep, or sleeping too much? 3-->nearly every day   Feeling tired or having little energy? 0-->not at all   Poor appetite or overeating? 0-->not at all   Feeling bad about yourself - or that you are a failure or have let yourself or your family down? 3-->nearly every day   Trouble concentrating on things, such as reading the newspaper or watching television? 2-->more than half the days   Moving or speaking so slowly that other people could have noticed? Or the opposite - being so fidgety or restless that you have been moving around a lot more than usual? 0-->not at all   Thoughts that you would be better off dead, or of hurting yourself in some way? 0-->not at all   PHQ-9 Total Score 14   If you checked off any problems, how difficult have these problems made it for you to do your work, take care of things at home, or get along with other people? somewhat difficult      Result Review  Data Reviewed:   The following data was reviewed by: KEIKO Hebert on 02/08/2023:  Urine Drug Screen - Urine, Clean Catch (09/27/2022 10:25)            Assessment and Plan      Diagnoses and all orders for this visit:    1. Attention deficit hyperactivity disorder (ADHD), predominantly inattentive type (Primary)    2. Mild episode of recurrent major depressive disorder (HCC)  -     ARIPiprazole (Abilify) 5 MG tablet; Take 1 tablet by mouth Daily.  Dispense: 30 tablet; Refill: 2    3. Anxiety  -     busPIRone (BUSPAR) 10 MG tablet; Take 1 tablet by mouth 3 (Three) Times a Day As Needed (anxiety).  Dispense: 90 tablet; Refill: 2    4. Overweight (BMI 25.0-29.9)      Pt here today for a 1 month adhd and depression follow up. She was switched from focalin to Vyvanse last month and feels her focus is more stable. She is overall pleased with how well it is working. She also started abilify last month for depression symptoms. She states she initially did not take the abilify routinely, but has now started to take this daily over the past few weeks. She does feel she is seeing some improvements in her symptoms and feels this will continue to improve the longer she is taking this. Denies SI/HI or thoughts of self harm at this time. She does admit to having these thoughts in the past. She states she has several life stressors including going through a divorce and does have increased anxiety.     Plan:    1. Continue Vyvanse 40mg. Nnamdi is clean. UDS is up to date and appropriate.   2. Continue Abilify 5mg daily. She would like to give it more time on this dose since she has only been consistently taking this for 2-3 weeks.  Instructed pt to follow up if she feels she needs to increase this dose.   3. Start Buspar 10mg TID prn for increased anxiety. Medication counseling done and dosing instructions discussed.   4. F/u 1-3 months.     ADHD Follow up:    PDMP reviewed and is clean. ADRS (Adult ADHD Assessment Form) reviewed in detail, scanned in chart, and has been reviewed at time of appointment.  All questions, including medication and side effects, were discussed in  detail at time of patient's visit. Patient will continue same medication which was discussed at today's visit. Patient is to return in 3 month(s).    Last Urine Toxicity     LAST URINE TOXICITY RESULTS Latest Ref Rng & Units 9/27/2022    AMPHETAMINES SCREEN, URINE Negative Negative    BARBITURATES SCREEN Negative Negative    BENZODIAZEPINE SCREEN, URINE Negative Negative    BUPRENORPHINEUR Negative Negative    COCAINE SCREEN, URINE Negative Negative    METHADONE SCREEN, URINE Negative Negative    METHAMPHETAMINEUR Negative Negative           Urine Drug Screen Results: UDS RESULTS: Current results appropriate      Follow Up   Return in about 3 months (around 5/8/2023) for Recheck.  Patient was given instructions and counseling regarding her condition or for health maintenance advice. Please see specific information pulled into the AVS if appropriate.       Answers for HPI/ROS submitted by the patient on 2/7/2023  Please describe your symptoms.: 4 week check up after being prescribed Vyvanse  Have you had these symptoms before?: Yes  How long have you been having these symptoms?: Greater than 2 weeks  Please list any medications you are currently taking for this condition.: Vyvanse 40mg  What is the primary reason for your visit?: Other

## 2023-04-19 DIAGNOSIS — F90.2 ATTENTION DEFICIT HYPERACTIVITY DISORDER (ADHD), COMBINED TYPE: ICD-10-CM

## 2023-05-08 ENCOUNTER — OFFICE VISIT (OUTPATIENT)
Dept: FAMILY MEDICINE CLINIC | Facility: CLINIC | Age: 33
End: 2023-05-08
Payer: COMMERCIAL

## 2023-05-08 VITALS
DIASTOLIC BLOOD PRESSURE: 81 MMHG | SYSTOLIC BLOOD PRESSURE: 105 MMHG | HEART RATE: 74 BPM | HEIGHT: 69 IN | RESPIRATION RATE: 20 BRPM | BODY MASS INDEX: 27.11 KG/M2 | WEIGHT: 183 LBS | TEMPERATURE: 97.2 F

## 2023-05-08 DIAGNOSIS — F41.9 ANXIETY: ICD-10-CM

## 2023-05-08 DIAGNOSIS — F90.2 ATTENTION DEFICIT HYPERACTIVITY DISORDER (ADHD), COMBINED TYPE: Primary | ICD-10-CM

## 2023-05-08 DIAGNOSIS — F90.0 ATTENTION DEFICIT HYPERACTIVITY DISORDER (ADHD), PREDOMINANTLY INATTENTIVE TYPE: Primary | ICD-10-CM

## 2023-05-08 DIAGNOSIS — F33.0 MILD EPISODE OF RECURRENT MAJOR DEPRESSIVE DISORDER: ICD-10-CM

## 2023-05-08 PROCEDURE — 99214 OFFICE O/P EST MOD 30 MIN: CPT | Performed by: NURSE PRACTITIONER

## 2023-05-08 RX ORDER — BUSPIRONE HYDROCHLORIDE 10 MG/1
10 TABLET ORAL 3 TIMES DAILY PRN
Qty: 90 TABLET | Refills: 2 | Status: SHIPPED | OUTPATIENT
Start: 2023-05-08

## 2023-05-08 RX ORDER — ARIPIPRAZOLE 5 MG/1
5 TABLET ORAL DAILY
Qty: 30 TABLET | Refills: 2 | Status: SHIPPED | OUTPATIENT
Start: 2023-05-08

## 2023-05-08 NOTE — PROGRESS NOTES
"Chief Complaint  ADD and Med Refill    Subjective    History of Present Illness      Patient presents to [facility] for   History of Present Illness  States she is having some anxiety, she is taking her Buspar about 2 times per week.She was unable to fill her last rx due to needing a pa. She feels that the med is not lasting as long as previously. It is wearing off in the afternoons.        Review of Systems    I have reviewed and agree with the HPI and ROS information as above.  Raine Gordillo, APRN     Objective   Vital Signs:   /81   Pulse 74   Temp 97.2 °F (36.2 °C)   Resp 20   Ht 175.3 cm (69\")   Wt 83 kg (183 lb)   BMI 27.02 kg/m²     BMI is >= 25 and <30. (Overweight) The following options were offered after discussion;: weight loss educational material (shared in after visit summary)      Physical Exam  Constitutional:       Appearance: Normal appearance. She is well-developed.   HENT:      Head: Normocephalic and atraumatic.      Right Ear: External ear normal.      Left Ear: External ear normal.      Nose: Nose normal. No nasal tenderness or congestion.      Mouth/Throat:      Lips: Pink. No lesions.      Mouth: Mucous membranes are moist. No oral lesions.      Dentition: Normal dentition.      Pharynx: Oropharynx is clear. No pharyngeal swelling, oropharyngeal exudate or posterior oropharyngeal erythema.   Eyes:      General: Lids are normal. Vision grossly intact. No scleral icterus.        Right eye: No discharge.         Left eye: No discharge.      Extraocular Movements: Extraocular movements intact.      Conjunctiva/sclera: Conjunctivae normal.      Right eye: Right conjunctiva is not injected.      Left eye: Left conjunctiva is not injected.      Pupils: Pupils are equal, round, and reactive to light.   Cardiovascular:      Rate and Rhythm: Normal rate and regular rhythm.      Heart sounds: Normal heart sounds. No murmur heard.    No gallop. "   Pulmonary:      Effort: Pulmonary effort is normal.      Breath sounds: Normal breath sounds and air entry. No wheezing, rhonchi or rales.   Musculoskeletal:         General: No tenderness or deformity. Normal range of motion.      Cervical back: Full passive range of motion without pain, normal range of motion and neck supple.      Right lower leg: No edema.      Left lower leg: No edema.   Skin:     General: Skin is warm and dry.      Coloration: Skin is not jaundiced.      Findings: No rash.   Neurological:      Mental Status: She is alert and oriented to person, place, and time.      Sensory: Sensation is intact.      Motor: Motor function is intact.      Coordination: Coordination is intact.      Gait: Gait is intact.   Psychiatric:         Attention and Perception: Attention normal.         Mood and Affect: Mood and affect normal.         Behavior: Behavior is not hyperactive. Behavior is cooperative.         Thought Content: Thought content normal.         Judgment: Judgment normal.          CHRISTOFER-7: Over the last two weeks, how often have you been bothered by the following problems?  Feeling nervous, anxious or on edge: More than half the days  Not being able to stop or control worrying: More than half the days  Worrying too much about different things: More than half the days  Trouble Relaxing: Several days  Being so restless that it is hard to sit still: Several days  Becoming easily annoyed or irritable: Several days  Feeling afraid as if something awful might happen: Several days  CHRISTOFER 7 Total Score: 10  If you checked any problems, how difficult have these problems made it for you to do your work, take care of things at home, or get along with other people: Somewhat difficult    PHQ-2 Depression Screening  Little interest or pleasure in doing things? 0-->not at all   Feeling down, depressed, or hopeless? 0-->not at all   PHQ-2 Total Score 0     PHQ-9 Depression Screening  Little interest or pleasure in  doing things? 0-->not at all   Feeling down, depressed, or hopeless? 0-->not at all   Trouble falling or staying asleep, or sleeping too much?     Feeling tired or having little energy?     Poor appetite or overeating?     Feeling bad about yourself - or that you are a failure or have let yourself or your family down?     Trouble concentrating on things, such as reading the newspaper or watching television?     Moving or speaking so slowly that other people could have noticed? Or the opposite - being so fidgety or restless that you have been moving around a lot more than usual?     Thoughts that you would be better off dead, or of hurting yourself in some way?     PHQ-9 Total Score 0   If you checked off any problems, how difficult have these problems made it for you to do your work, take care of things at home, or get along with other people?        Result Review  Data Reviewed:                   Assessment and Plan      Diagnoses and all orders for this visit:    1. Attention deficit hyperactivity disorder (ADHD), predominantly inattentive type (Primary)    2. Anxiety  -     busPIRone (BUSPAR) 10 MG tablet; Take 1 tablet by mouth 3 (Three) Times a Day As Needed (anxiety).  Dispense: 90 tablet; Refill: 2    3. Mild episode of recurrent major depressive disorder  -     ARIPiprazole (Abilify) 5 MG tablet; Take 1 tablet by mouth Daily.  Dispense: 30 tablet; Refill: 2    Patient comes in today to follow-up on ADD.  Overall she is happy with how her medications are working other than the fact that she feels like the Vyvanse wears off too early.  She does feel like that the BuSpar on a as needed basis a couple times a week is controlling her anxiety enough.  We will carefully increase Vyvanse to 50 mg.  Nnamdi is clean.  Drug screen is up-to-date.        Follow Up   Return in about 4 weeks (around 6/5/2023).  Patient was given instructions and counseling regarding her condition or for health maintenance advice. Please  see specific information pulled into the AVS if appropriate.

## 2023-05-11 NOTE — TELEPHONE ENCOUNTER
Submitted medication PA for Vyvanse 50mg via covermymeds. Med PA [employer] approved from 5/11/23-5/10/24.    Sent pt [employer] message informing of above.

## 2023-06-14 DIAGNOSIS — F90.2 ATTENTION DEFICIT HYPERACTIVITY DISORDER (ADHD), COMBINED TYPE: ICD-10-CM

## 2023-07-24 ENCOUNTER — E-VISIT (OUTPATIENT)
Dept: FAMILY MEDICINE CLINIC | Facility: TELEHEALTH | Age: 33
End: 2023-07-24
Payer: COMMERCIAL

## 2023-07-24 PROCEDURE — BRIGHTMDVISIT: Performed by: NURSE PRACTITIONER

## 2023-07-24 NOTE — E-VISIT TREATED
Chief Complaint: Mouth sores   Patient introduction   Patient is 32-year-old female who presents with swelling, fissures, sores or blisters, and redness on or below their lower lip. Has tightness or discomfort when opening their mouth wide. Number of lesions: more than 10.   Symptoms have been present for 7 to 10 days.   Warning. The following may warrant further investigation:    Associated symptoms include sore throat   General presentation   No fever.   Surface sensations include pain/tenderness. Condition does not interfere with talking, eating, or drinking.   No facial edema.   Felt tingling/pain/burning sensation in same location prior to symptom onset. Patient had extended sun exposure and increased stress or life changes just prior to symptom onset.   Does not wear dentures.   Taking non-prescription ibuprofen for current symptoms.   History of similar mouth symptoms in the past, with no instances of similar symptoms in the past 1 year. Did not see a healthcare provider for similar symptoms in the past.   Sexual history: Has had vaginal, anal, or oral sex in past 3 months. Has not had multiple partners in past 3 months. Does not use injection drugs. Sexual partner does not use injection drugs.   Review of red flags/alarm symptoms:    No premalignant mouth lesions    No oral cancer    No unexplained sores or rash-like blisters on other parts of the body    No celiac disease    No neutropenia    No inflammatory bowel disease    No lupus    No Behcet's syndrome    No Sriram's syndrome   Pregnancy/menstrual status/breastfeeding:   Not pregnant. Not breastfeeding. Regarding date of last menstrual period, patient writes: My period started yesterday, 7/23.   Self-exam:    Swollen, tender glands/lymph nodes   Current medications   Currently taking lisdexamfetamine 50 MG capsule, multivitamin with minerals tablet tablet, and albuterol 0.63 MG/3ML nebulizer solution.   Medication allergies   None.   Medication  contraindication review   No history of aspirin triad, NSAID-induced asthma/urticaria, or CABG surgery.   Past medical history   Immune conditions: No immunocompromising conditions.   No history of cancer.   Social history   Patient used tobacco in the past and quit within the last 10 years. Drinks 2 to 5 alcoholic drinks per week.   Patient-submitted comments   Patient was asked if they had anything to add about their symptoms. Patient writes: I have gotten cold sores my whole life and this one is the worst I've had in a long time .   Patient did not request an excuse note.   Assessment   Herpes labialis.   Plan   Medications:    valacyclovir 1 gram tablet RX 1000mg 1 tab PO q12h 7d until the full prescribed amount is finished. Drink plenty of water while taking this medication. Amount is 14 tab.   The patient's prescription will be sent to:   [id number]   [address] [place] [address]   Phone: [phone]     Fax: [phone]   Education:    Condition and causes    Prevention    Treatment and self-care    When to call provider   ----------   Electronically signed by KEIKO Harris on 2023-07-24 at 14:57PM   ----------   Patient Interview Transcript:   Are your symptoms on the inside or the outside of your mouth? - Inside includes your tongue, inner cheek or lips, gums, or roof of mouth - Outside includes your lips, skin surrounding lips, or corners of mouth Select all that apply.    Outside   Not selected:    Inside   Which areas outside your mouth are affected? Select all that apply.    On or just below my lower lip   Not selected:    On or just above my upper lip    The corners of my mouth   Which of these best describe the symptoms outside your mouth? Select all that apply.    Swelling    Cracking    Sores or blisters    Redness   Not selected:    Scaling or flaking skin    Darkening of the skin   How long have you had these symptoms? Select one.    7 to 10 days   Not selected:    1 to 3  days    4 to 6 days    More than 10 days   Do you have any of these in the affected area of your mouth? Select all that apply.    Pain or tenderness   Not selected:    Itching    Burning    A cottony feeling    Loss of taste    None of the above   How many mouth lesions, sores, or bumps do you have? Select one.    More than 10   Not selected:    1    2 to 4    5 to 10   Mouth sores can interfere with talking, eating, or drinking. How much does the mouth sore affect your ability to do these things? Select one.    Not at all   Not selected:    Somewhat, but I can still talk, eat, and drink    It makes talking, eating, or drinking impossible   Is there any swelling, redness, or warmth on your cheek or any other part of your face (not including the mouth area)? Select one.    No, not that I can tell   Not selected:    Yes   Before your symptoms began, did you feel any tingling, pain, or burning in the area? Select one.    Yes   Not selected:    No, not that I remember   Ready to send photos? If you choose not to send photos, you may need to speak to a provider to get care. Select one.    OK, I'll send photos   Not selected:    No, I'd rather not send photos   Send up to three photos for the provider to review: - Don't use a flash. - Make sure the photos are in focus. - Take at least one close-up photo of the affected area. - Take a photo showing the location in or around your mouth. - Take a photo showing your entire face.    Upload 1    Upload 2   Not selected:    Upload 3   Do you have tightness or discomfort when opening your mouth wide? Select one.    Yes   Not selected:    No   Do you have any sores (or rash-like blisters) on other parts of your body that can't be explained by another condition? Select all that apply.    No   Not selected:    Chin    Nose    Cheeks    Forehead    Arms, legs, or upper body    Palms of hands    Soles of feet    Genitals   Do you have any of these symptoms? Select all that apply.     Sore throat   Not selected:    Chills    Loss of appetite    Headache    Muscle aches    None of the above   Have you had a fever along with your mouth symptoms? Select one.    No, not that I know of   Not selected:    Yes   Are your glands/lymph nodes swollen or painful to the touch?    Yes   Not selected:    No, not that I can tell   Just before your symptoms began, did any of these apply to you? Select all that apply.    Extended sun exposure (or sunburn)    Increased stress or major life change   Not selected:    Dental work    Onset of menstrual period    Fever or viral infection (such as cold or flu)    None of the above   Do you wear dentures? Select one.    No   Not selected:    Yes   Have you had these symptoms before? Select one.    Yes   Not selected:    No, not that I remember   In the past year, how many times have you had these symptoms? Select one.    0   Not selected:    1    2    3    More than 3    I'm not sure   When you had these symptoms before, did you see a healthcare provider? Select one.    No   Not selected:    Yes   In the last 3 months, have either of these applied to you? Select all that apply.    Neither of the above   Not selected:    Weight loss of 10 pounds or more without trying to lose weight    Severe fatigue or tiredness that doesn't improve with rest   In the last 3 months, have you had any of these symptoms that couldn't be explained by another condition? Select all that apply.    None of the above   Not selected:    Repeated episodes of diarrhea or bloody stools    Eye symptoms (pain, redness, discharge, sensitivity to light, or vision changes)    Urinary symptoms (burning with urination, blood in the urine, or frequent urination)    Joint symptoms (pain, redness, warmth, or swelling)   Have you ever been diagnosed with any of these conditions? Select all that apply.    None of the above   Not selected:    Behcet's syndrome    Celiac disease    Diabetes    Mouth cancer, such  as squamous cell cancer or melanoma    Neutropenia    Nutritional deficiency (vitamin B-12, zinc, folic acid, iron deficiency, or malnutrition)    Premalignant (precancerous) mouth lesions (leukoplakia, erythroplakia)    Reactive arthritis (Sriram's syndrome)   Have you had any of these conditions? Select all that apply.    No, not that I know of   Not selected:    Aspirin triad (Samter's triad, or aspirin-sensitive asthma)    History of asthma or hives caused by NSAID drugs (aspirin, ibuprofen, or naproxen)    Coronary artery bypass graft (CABG) surgery   Do you have any of these conditions that can affect the immune system? Scroll to see all options. Select all that apply.    None of these   Not selected:    History of bone marrow transplant    Chronic kidney disease    Chronic liver disease (including cirrhosis)    HIV/AIDS    Inflammatory bowel disease (Crohn's disease or ulcerative colitis)    Lupus    Moderate to severe plaque psoriasis    Multiple sclerosis    Rheumatoid arthritis    Sickle cell anemia    Alpha or beta thalassemia    History of solid organ transplant (kidney, liver, or heart)    History of spleen removal    An autoimmune disorder not listed here (specify)    A condition requiring treatment with long-term use of oral steroids (such as prednisone, prednisolone, or dexamethasone) (specify)   Have you ever been diagnosed with cancer? Select one.    No   Not selected:    Yes, I have cancer now    Yes, but I'm in remission   Are you pregnant? Select one.    No   Not selected:    Yes   When was your last menstrual period? If you don't currently have periods or no longer have periods, please briefly explain.    My period started yesterday, 7/23   Are you breastfeeding? Select one.    No   Not selected:    Yes   Using tobacco can put you at risk for more serious mouth conditions. Do you use any tobacco products? This includes smoking, vaping, snorting, or chewing tobacco. Select one.    No, I quit    Not selected:    Yes, every day    Yes, some days    No, never   Congratulations on quitting! How long ago did you quit? Select one.    Less than 10 years ago   Not selected:    Less than 30 days ago    More than 10 years ago   Do you drink alcohol? Select one.    2 to 5 drinks per week   Not selected:    1 drink or less per week    1 to 2 drinks per day    3 or more drinks per day    No   In the last 3 months, have you used any injection drugs, such as heroin, cocaine, crystal meth, amphetamines, or opiates? Your response to this question will only be shared with your care provider. Select one.    No   Not selected:    Yes   In the last 3 months, have you had vaginal, anal, or oral sex? Select one.    Yes   Not selected:    No   In the last 3 months, have you had sex with more than one partner? Select one.    No   Not selected:    Yes   In the last 3 months, has your partner used any injection drugs, such as heroin, cocaine, crystal meth, amphetamines, or opiates? Select one.    No, not that I know of   Not selected:    Yes   The next set of questions is about medications and medication-related allergies. Have you used any of these non-prescription medications for your current symptoms? Select all that apply.    Ibuprofen (Advil, Motrin)   Not selected:    Acetaminophen (Tylenol)    Benzocaine topical gel (Anbesol, Zilactin-B)    Clotrimazole (Desenex, Micotrin)    Miconazole (Micaderm, Micatin)    Zinc oxide    None of the above   Are you still taking these medications listed in your medical record? If you're not taking any of these, click Next. Select all that apply.    lisdexamfetamine 50 MG capsule    multivitamin with minerals tablet tablet    albuterol 0.63 MG/3ML nebulizer solution   Are you taking any other medications, vitamins, or supplements? Select one.    No   Not selected:    Yes   Have you ever had an allergic or bad reaction to any medication? Select one.    No   Not selected:    Yes   Do you need a  doctor's note? A doctor's note confirms that you received care today and states when you can return to school or work. It does not contain information about your diagnosis or treatment plan. Your provider will make the final decision on whether to give you a doctor's note. Doctor's notes CANNOT be backdated. Select one.    No   Not selected:    Today only (1 day)    Today and tomorrow (2 days)    3 days   Is there anything you'd like to add about your symptoms? Please limit your comments to the symptoms asked about in this interview. If you include comments about other concerns, your provider may recommend that you be seen in person.    I have gotten cold sores my whole life and this one is the worst I've had in a long time   ----------   Medical history   Medical history data does not currently exist for this patient.

## 2023-07-24 NOTE — EXTERNAL PATIENT INSTRUCTIONS
"   Diagnosis   Cold sores (herpes labialis)   My name is KEIKO Harris, and I'm a healthcare provider at [facility]. I've reviewed your photos and responses. Your symptoms suggest you have cold sores. Cold sores usually go away on their own in a week or two.   Medications   Your pharmacy   [id number] [address] [place] [address] [phone]     Prescription   Valacyclovir oral tablet (1000mg): Take 1 tablet by mouth every 12 hours for 7 days, until the full prescribed amount is finished. Drink plenty of water while taking this medication.   About your diagnosis   Cold sores are small, fluid-filled blisters that develop on or around the lips. They're also commonly known as \"fever blisters.\" Other areas around the mouth can be affected, including under the nose and just above the chin. Cold sores often come grouped together in patches. As the blisters break open, the shallow, open sores will ooze and then crust over.   Cold sores are caused by the herpes simplex virus (HSV). Cold sores are contagious. The virus is easily spread through contact with the cold sore itself or the fluid in the blisters. Kissing, sharing utensils, or sharing a razor can all spread the virus.   There are two types of herpes simplex virus: HSV-1 and HSV-2. Both types of HSV virus can cause sores around the mouth (cold sores) and on the genitals (genital herpes). For this reason, herpes can be spread through oral sex.   Cold sores tend to come back in the same place. The first time you get one, you may have a fever, swollen lymph nodes, a sore throat, or a headache. These symptoms usually don't happen again when cold sores return. Pain and blisters are usually less severe with repeat outbreaks. Some people can tell when their cold sores are about to come back because they feel tingling, pain, or burning in the same spot. This is known as the \"prodrome.\"   Common triggers of cold sores include:    Increased stress or a " life change    Extended sun exposure    A recent viral infection, such as a cold    Hormonal changes, such as before or during your menstrual period    Dental work   Common signs and symptoms of cold sores include:    Pain or tenderness at the affected area.    Fluid-filled blisters.    Lesions above or below the lips or at the corners of the mouth. These are the most common areas affected by cold sores.    Having 1 to 5 sores. Cold sore outbreaks usually involve fewer than 5 sores.    A bumpy surface on the affected area.    A previous history of cold sores.   What to expect   There is no cure for cold sores, and you'll likely have flares throughout your lifetime. However, you can manage your symptoms with this treatment plan. Even without treatment, cold sores aren't dangerous and most go away on their own within 7 to 14 days.   Using alcohol and tobacco may worsen cold sores. In fact, prolonged alcohol and tobacco use puts you at higher risk for oral cancer.   Congratulations on quitting tobacco! Even though you no longer smoke, I'd like you to schedule a follow-up appointment in the next 2 weeks so we can take a look at your mouth. Please schedule this follow-up visit even if your symptoms are better.   When to seek care   Call us at [phone]   with any sudden or unexpected symptoms.    Sores lasting longer than 2 weeks.    Sores that often return.    Sores that become unbearably painful.    Extreme difficulty eating or drinking.    A fever higher than 103F or lasting longer than 4 days.   Other treatment   There are many home remedies for cold sores. However, the evidence is mixed on whether they help healing. Lysine, propolis (synthetic beeswax), and a cream combining rhubarb and gm may help soothe the sores and shorten healing time. At the very least, these remedies won't cause harm.   Putting an ice cube on the cold sores may also help reduce pain and swelling.   Prevention   Since cold sores may  be triggered by stress, think about changes you can make to manage stress. Many people find regular exercise, meditation, and yoga helpful for managing stress.   While you have cold sores, avoid kissing and sharing utensils, cups, lip balm, or razors. If you're sexually active, avoid oral sex while you have cold sores.   The virus can spread even when you don't have any sores. However, it spreads most easily when you have moist, active sores.   Your provider   Your diagnosis was provided by KEIKO Harris, a member of your trusted care team at [facility].   If you have any questions, call us at [phone]  .

## 2023-08-28 DIAGNOSIS — F90.0 ATTENTION DEFICIT HYPERACTIVITY DISORDER (ADHD), PREDOMINANTLY INATTENTIVE TYPE: ICD-10-CM

## 2023-08-28 NOTE — TELEPHONE ENCOUNTER
Pt was last seen on 6-22-23 and okayed for a 3 month f/u.  Pt is due for her 3rd refill on Vyvanse.  Routing to Dr. Parker for appoval.  Sent message via [employer] informing pt of refill request status.  HUB MAY READ.

## 2023-09-05 DIAGNOSIS — F90.0 ATTENTION DEFICIT HYPERACTIVITY DISORDER (ADHD), PREDOMINANTLY INATTENTIVE TYPE: ICD-10-CM

## 2023-09-05 NOTE — TELEPHONE ENCOUNTER
Caller: Myrtle Hurst    Relationship: Self    Best call back number: [phone]    Requested Prescriptions:   Requested Prescriptions     Pending Prescriptions Disp Refills    lisdexamfetamine (Vyvanse) 50 MG capsule 30 capsule 0     Sig: Take 1 capsule by mouth Every Morning for 30 days        Pharmacy where request should be sent: [facility] [place], [place] - [address] AT [facility] - [phone] [facility] [phone]      Last office visit with prescribing clinician: 10/26/2022   Last telemedicine visit with prescribing clinician: Visit date not found   Next office visit with prescribing clinician: Visit date not found     Additional details provided by patient: PATIENT IS COMPLETELY OUT OF MEDICATION-----[facility]    Does the patient have less than a 3 day supply:  [x] Yes  [] No    Would you like a call back once the refill request has been completed: [] Yes [] No    If the office needs to give you a call back, can they leave a voicemail: [] Yes [] No    Kimberly Royal Rep   09/05/23 16:09 CDT

## 2023-09-21 ENCOUNTER — OFFICE VISIT (OUTPATIENT)
Dept: FAMILY MEDICINE CLINIC | Facility: CLINIC | Age: 33
End: 2023-09-21
Payer: COMMERCIAL

## 2023-09-21 ENCOUNTER — LAB (OUTPATIENT)
Dept: LAB | Facility: HOSPITAL | Age: 33
End: 2023-09-21

## 2023-09-21 VITALS
HEART RATE: 65 BPM | WEIGHT: 169 LBS | HEIGHT: 69 IN | TEMPERATURE: 98.2 F | SYSTOLIC BLOOD PRESSURE: 103 MMHG | BODY MASS INDEX: 25.03 KG/M2 | OXYGEN SATURATION: 99 % | DIASTOLIC BLOOD PRESSURE: 73 MMHG

## 2023-09-21 DIAGNOSIS — J45.20 MILD INTERMITTENT ASTHMA, UNSPECIFIED WHETHER COMPLICATED: ICD-10-CM

## 2023-09-21 DIAGNOSIS — Z00.00 WELLNESS EXAMINATION: ICD-10-CM

## 2023-09-21 DIAGNOSIS — Z51.81 MEDICATION MONITORING ENCOUNTER: ICD-10-CM

## 2023-09-21 DIAGNOSIS — F90.0 ATTENTION DEFICIT HYPERACTIVITY DISORDER (ADHD), PREDOMINANTLY INATTENTIVE TYPE: Primary | ICD-10-CM

## 2023-09-21 LAB
ALBUMIN SERPL-MCNC: 4.4 G/DL (ref 3.5–5)
ALBUMIN/GLOB SERPL: 1.3 G/DL (ref 1.1–2.5)
ALP SERPL-CCNC: 60 U/L (ref 24–120)
ALT SERPL W P-5'-P-CCNC: 19 U/L (ref 0–35)
AMPHET+METHAMPHET UR QL: POSITIVE
AMPHETAMINES UR QL: NEGATIVE
ANION GAP SERPL CALCULATED.3IONS-SCNC: 7 MMOL/L (ref 4–13)
AST SERPL-CCNC: 22 U/L (ref 7–45)
AUTO MIXED CELLS #: 0.6 10*3/MM3 (ref 0.1–2.6)
AUTO MIXED CELLS %: 7.8 % (ref 0.1–24)
BACTERIA UR QL AUTO: ABNORMAL /HPF
BARBITURATES UR QL SCN: NEGATIVE
BENZODIAZ UR QL SCN: NEGATIVE
BILIRUB SERPL-MCNC: 0.6 MG/DL (ref 0.1–1)
BILIRUB UR QL STRIP: NEGATIVE
BUN SERPL-MCNC: 10 MG/DL (ref 5–21)
BUN/CREAT SERPL: 14.7
BUPRENORPHINE SERPL-MCNC: NEGATIVE NG/ML
CALCIUM SPEC-SCNC: 9 MG/DL (ref 8.4–10.4)
CANNABINOIDS SERPL QL: NEGATIVE
CHLORIDE SERPL-SCNC: 104 MMOL/L (ref 98–110)
CHOLEST SERPL-MCNC: 148 MG/DL (ref 130–200)
CLARITY UR: CLEAR
CO2 SERPL-SCNC: 26 MMOL/L (ref 24–31)
COCAINE UR QL: NEGATIVE
COLOR UR: YELLOW
CREAT SERPL-MCNC: 0.68 MG/DL (ref 0.5–1.4)
EGFRCR SERPLBLD CKD-EPI 2021: 117.4 ML/MIN/1.73
ERYTHROCYTE [DISTWIDTH] IN BLOOD BY AUTOMATED COUNT: 12.3 % (ref 12.3–15.4)
FENTANYL UR-MCNC: NEGATIVE NG/ML
GLOBULIN UR ELPH-MCNC: 3.5 GM/DL
GLUCOSE SERPL-MCNC: 101 MG/DL (ref 70–100)
GLUCOSE UR STRIP-MCNC: NEGATIVE MG/DL
HBA1C MFR BLD: 4.9 % (ref 4.8–5.9)
HCT VFR BLD AUTO: 39.1 % (ref 34–46.6)
HCV AB SER DONR QL: NORMAL
HDLC SERPL-MCNC: 67 MG/DL
HGB BLD-MCNC: 13.5 G/DL (ref 12–15.9)
HGB UR QL STRIP.AUTO: NEGATIVE
HYALINE CASTS UR QL AUTO: ABNORMAL /LPF
KETONES UR QL STRIP: NEGATIVE
LDLC SERPL CALC-MCNC: 69 MG/DL (ref 0–99)
LDLC/HDLC SERPL: 1.04 {RATIO}
LEUKOCYTE ESTERASE UR QL STRIP.AUTO: ABNORMAL
LYMPHOCYTES # BLD AUTO: 2.4 10*3/MM3 (ref 0.7–3.1)
LYMPHOCYTES NFR BLD AUTO: 29.5 % (ref 19.6–45.3)
MCH RBC QN AUTO: 31.1 PG (ref 26.6–33)
MCHC RBC AUTO-ENTMCNC: 34.5 G/DL (ref 31.5–35.7)
MCV RBC AUTO: 90.1 FL (ref 79–97)
METHADONE UR QL SCN: NEGATIVE
NEUTROPHILS NFR BLD AUTO: 5.2 10*3/MM3 (ref 1.7–7)
NEUTROPHILS NFR BLD AUTO: 62.7 % (ref 42.7–76)
NITRITE UR QL STRIP: NEGATIVE
OPIATES UR QL: NEGATIVE
OXYCODONE UR QL SCN: NEGATIVE
PCP UR QL SCN: NEGATIVE
PH UR STRIP.AUTO: 7.5 [PH] (ref 5–8)
PLATELET # BLD AUTO: 257 10*3/MM3 (ref 140–450)
PMV BLD AUTO: 10.7 FL (ref 6–12)
POTASSIUM SERPL-SCNC: 4 MMOL/L (ref 3.5–5.3)
PROPOXYPH UR QL: NEGATIVE
PROT SERPL-MCNC: 7.9 G/DL (ref 6.3–8.7)
PROT UR QL STRIP: NEGATIVE
RBC # BLD AUTO: 4.34 10*6/MM3 (ref 3.77–5.28)
RBC # UR STRIP: ABNORMAL /HPF
REF LAB TEST METHOD: ABNORMAL
SODIUM SERPL-SCNC: 137 MMOL/L (ref 135–145)
SP GR UR STRIP: 1.01 (ref 1–1.03)
SQUAMOUS #/AREA URNS HPF: ABNORMAL /HPF
TRICYCLICS UR QL SCN: NEGATIVE
TRIGL SERPL-MCNC: 58 MG/DL (ref 0–149)
TSH SERPL DL<=0.05 MIU/L-ACNC: 0.69 UIU/ML (ref 0.27–4.2)
UROBILINOGEN UR QL STRIP: ABNORMAL
VLDLC SERPL-MCNC: 12 MG/DL (ref 5–40)
WBC # UR STRIP: ABNORMAL /HPF
WBC NRBC COR # BLD: 8.2 10*3/MM3 (ref 3.4–10.8)

## 2023-09-21 PROCEDURE — 84443 ASSAY THYROID STIM HORMONE: CPT

## 2023-09-21 PROCEDURE — 81001 URINALYSIS AUTO W/SCOPE: CPT

## 2023-09-21 PROCEDURE — 87086 URINE CULTURE/COLONY COUNT: CPT

## 2023-09-21 PROCEDURE — 80053 COMPREHEN METABOLIC PANEL: CPT

## 2023-09-21 PROCEDURE — 80307 DRUG TEST PRSMV CHEM ANLYZR: CPT

## 2023-09-21 PROCEDURE — 83036 HEMOGLOBIN GLYCOSYLATED A1C: CPT

## 2023-09-21 PROCEDURE — 85025 COMPLETE CBC W/AUTO DIFF WBC: CPT

## 2023-09-21 PROCEDURE — 80061 LIPID PANEL: CPT

## 2023-09-21 PROCEDURE — 36415 COLL VENOUS BLD VENIPUNCTURE: CPT

## 2023-09-21 PROCEDURE — 86803 HEPATITIS C AB TEST: CPT

## 2023-09-21 PROCEDURE — 99214 OFFICE O/P EST MOD 30 MIN: CPT | Performed by: NURSE PRACTITIONER

## 2023-09-21 RX ORDER — ALBUTEROL SULFATE 90 UG/1
2 AEROSOL, METERED RESPIRATORY (INHALATION) EVERY 4 HOURS PRN
Qty: 18 G | Refills: 2 | Status: SHIPPED | OUTPATIENT
Start: 2023-09-21

## 2023-09-21 NOTE — PROGRESS NOTES
CMP-stable  Urine drug screen appropriate.  Will route prescription to Dr. Parker to sign.  Urinalysis-small leuks noted.  Will treat pending.  Lipid panel-within normal limits  CBC-within normal limits  Hemoglobin A1c stable at 4.9.  TSH, hep C screening, and urine culture still pending.

## 2023-09-21 NOTE — PROGRESS NOTES
"Chief Complaint  ADHD    Subjective        Myrtle Hurst presents to [facility]  History of Present Illness  Patient here for follow up of ADHD. Patient request refill on inhaler.     Objective   Vital Signs:  /73   Pulse 65   Temp 98.2 °F (36.8 °C)   Ht 175.3 cm (69\")   Wt 76.7 kg (169 lb)   SpO2 99%   BMI 24.96 kg/m²   Estimated body mass index is 24.96 kg/m² as calculated from the following:    Height as of this encounter: 175.3 cm (69\").    Weight as of this encounter: 76.7 kg (169 lb).       BMI is within normal parameters. No other follow-up for BMI required.      Physical Exam  Constitutional:       Appearance: Normal appearance. She is well-developed.   HENT:      Head: Normocephalic and atraumatic.      Right Ear: External ear normal.      Left Ear: External ear normal.      Nose: Nose normal. No nasal tenderness or congestion.      Mouth/Throat:      Lips: Pink. No lesions.      Mouth: Mucous membranes are moist. No oral lesions.      Dentition: Normal dentition.      Pharynx: Oropharynx is clear. No pharyngeal swelling, oropharyngeal exudate or posterior oropharyngeal erythema.   Eyes:      General: Lids are normal. Vision grossly intact. No scleral icterus.        Right eye: No discharge.         Left eye: No discharge.      Extraocular Movements: Extraocular movements intact.      Conjunctiva/sclera: Conjunctivae normal.      Right eye: Right conjunctiva is not injected.      Left eye: Left conjunctiva is not injected.      Pupils: Pupils are equal, round, and reactive to light.   Cardiovascular:      Rate and Rhythm: Normal rate and regular rhythm.      Heart sounds: Normal heart sounds. No murmur heard.    No gallop.   Pulmonary:      Effort: Pulmonary effort is normal.      Breath sounds: Normal breath sounds and air entry. No wheezing, rhonchi or rales.   Musculoskeletal:         General: No tenderness or deformity. Normal range of motion.      Cervical " back: Full passive range of motion without pain, normal range of motion and neck supple.      Right lower leg: No edema.      Left lower leg: No edema.   Skin:     General: Skin is warm and dry.      Coloration: Skin is not jaundiced.      Findings: No rash.   Neurological:      Mental Status: She is alert and oriented to person, place, and time.      Sensory: Sensation is intact.      Motor: Motor function is intact.      Coordination: Coordination is intact.      Gait: Gait is intact.   Psychiatric:         Attention and Perception: Attention normal.         Mood and Affect: Mood and affect normal.         Behavior: Behavior is not hyperactive. Behavior is cooperative.         Thought Content: Thought content normal.         Judgment: Judgment normal.      Result Review :  The following data was reviewed by: KEIKO Hebert on 09/21/2023:  Urine Drug Screen - Urine, Clean Catch (09/27/2022 10:25)              Assessment and Plan   Diagnoses and all orders for this visit:    1. Attention deficit hyperactivity disorder (ADHD), predominantly inattentive type (Primary)    2. Mild intermittent asthma, unspecified whether complicated  -     albuterol sulfate  (90 Base) MCG/ACT inhaler; Inhale 2 puffs Every 4 (Four) Hours As Needed for Wheezing.  Dispense: 18 g; Refill: 2    Patient here today for 3-month ADHD follow-up.  She states she is doing well with her current dose of Vyvanse and feels her focus is stable.  She would like to continue the same at this time.  She is also requesting a refill of her albuterol inhaler.  Nnamdi is clean.  She is due for routine monitoring urine drug screen.  Order was placed for this at her last visit in June.  The order is still active.  Instructed patient to have this completed today.  Continue Vyvanse 50 mg pending clean urine drug screen.  Refill sent of albuterol inhaler.  Follow-up 3 months.        ADHD Follow up:    PDMP reviewed and is clean. ADRS (Adult ADHD  Assessment Form) reviewed in detail, scanned in chart, and has been reviewed at time of appointment.  All questions, including medication and side effects, were discussed in detail at time of patient's visit. Patient will continue same medication which was discussed at today's visit. Patient is to return in 3 month(s).    Last Urine Toxicity          Latest Ref Rng & Units 9/27/2022   LAST URINE TOXICITY RESULTS   Amphetamine, Urine Qual Negative Negative    Barbiturates Screen, Urine Negative Negative    Benzodiazepine Screen, Urine Negative Negative    Buprenorphine, Screen, Urine Negative Negative    Cocaine Screen, Urine Negative Negative    Methadone Screen , Urine Negative Negative    Methamphetamine, Ur Negative Negative         Urine Drug Screen Results: UDS RESULTS: Updated results needed    Follow Up   Return in about 3 months (around 12/21/2023) for Recheck.  Patient was given instructions and counseling regarding her condition or for health maintenance advice. Please see specific information pulled into the AVS if appropriate.

## 2023-09-22 LAB — BACTERIA SPEC AEROBE CULT: NO GROWTH

## 2023-10-18 DIAGNOSIS — F90.0 ATTENTION DEFICIT HYPERACTIVITY DISORDER (ADHD), PREDOMINANTLY INATTENTIVE TYPE: ICD-10-CM

## 2023-10-19 RX ORDER — LISDEXAMFETAMINE DIMESYLATE CAPSULES 50 MG/1
50 CAPSULE ORAL EVERY MORNING
Qty: 30 CAPSULE | Refills: 0 | OUTPATIENT
Start: 2023-10-19 | End: 2023-11-18

## 2023-11-17 DIAGNOSIS — F90.0 ATTENTION DEFICIT HYPERACTIVITY DISORDER (ADHD), PREDOMINANTLY INATTENTIVE TYPE: ICD-10-CM

## 2023-11-17 RX ORDER — LISDEXAMFETAMINE DIMESYLATE CAPSULES 50 MG/1
50 CAPSULE ORAL EVERY MORNING
Qty: 30 CAPSULE | Refills: 0 | Status: SHIPPED | OUTPATIENT
Start: 2023-11-17 | End: 2023-12-17

## 2023-11-27 DIAGNOSIS — F90.2 ATTENTION DEFICIT HYPERACTIVITY DISORDER (ADHD), COMBINED TYPE: ICD-10-CM

## 2023-11-27 RX ORDER — LISDEXAMFETAMINE DIMESYLATE CAPSULES 50 MG/1
50 CAPSULE ORAL EVERY MORNING
Qty: 30 CAPSULE | Refills: 0 | Status: SHIPPED | OUTPATIENT
Start: 2023-11-27 | End: 2023-12-27

## 2023-11-27 NOTE — TELEPHONE ENCOUNTER
Caller: Myrtle Hurst     Relationship: SELF     Best call back number:     [phone]        Requested Prescriptions:     lisdexamfetamine (Vyvanse) 50 MG capsule  50 mg, Every Morning          Pharmacy where request should be sent:  [facility] [address] [place] [address] [address] - [phone]  - [phone]  [phone]     Last office visit with prescribing clinician: 10/26/2022   Last telemedicine visit with prescribing clinician: Visit date not found   Next office visit with prescribing clinician: Visit date not found     Additional details provided by patient: STATES SHE FOUND A PHARMACY THAT DOES HAVE THE VYVANSE AT [facility]     Does the patient have less than a 3 day supply:  [x] Yes  [] No    Would you like a call back once the refill request has been completed: [x] Yes [] No    If the office needs to give you a call back, can they leave a voicemail: [x] Yes [] No    Kimberly Rain Rep   11/27/23 13:43 CST

## 2023-11-27 NOTE — TELEPHONE ENCOUNTER
Pt requesting pharmacy change. Contacted [facility] and canceled vyvanse prescription sent on 23. Routing medication to Dr. Parker to review and sign to be sent to pt's preferred pharmacy. Contacted pt, verified name and , informed of above. Vu of above and thanked staff.

## 2023-12-22 ENCOUNTER — OFFICE VISIT (OUTPATIENT)
Dept: FAMILY MEDICINE CLINIC | Facility: CLINIC | Age: 33
End: 2023-12-22
Payer: COMMERCIAL

## 2023-12-22 VITALS
HEIGHT: 69 IN | HEART RATE: 80 BPM | OXYGEN SATURATION: 99 % | WEIGHT: 164 LBS | DIASTOLIC BLOOD PRESSURE: 71 MMHG | BODY MASS INDEX: 24.29 KG/M2 | SYSTOLIC BLOOD PRESSURE: 103 MMHG

## 2023-12-22 DIAGNOSIS — F90.0 ATTENTION DEFICIT HYPERACTIVITY DISORDER (ADHD), PREDOMINANTLY INATTENTIVE TYPE: Primary | ICD-10-CM

## 2023-12-22 DIAGNOSIS — F90.2 ATTENTION DEFICIT HYPERACTIVITY DISORDER (ADHD), COMBINED TYPE: Primary | ICD-10-CM

## 2023-12-22 PROCEDURE — 99213 OFFICE O/P EST LOW 20 MIN: CPT | Performed by: NURSE PRACTITIONER

## 2023-12-22 RX ORDER — LISDEXAMFETAMINE DIMESYLATE CAPSULES 50 MG/1
50 CAPSULE ORAL EVERY MORNING
Qty: 30 CAPSULE | Refills: 0 | Status: SHIPPED | OUTPATIENT
Start: 2024-01-19 | End: 2024-02-18

## 2023-12-22 RX ORDER — LISDEXAMFETAMINE DIMESYLATE CAPSULES 50 MG/1
50 CAPSULE ORAL EVERY MORNING
Qty: 30 CAPSULE | Refills: 0 | Status: SHIPPED | OUTPATIENT
Start: 2023-12-22 | End: 2024-01-21

## 2023-12-22 NOTE — PROGRESS NOTES
"Chief Complaint  ADHD    Subjective    History of Present Illness      Patient presents to [facility] for   History of Present Illness  ADHD/Mood HPI    Visit for:  follow-up. Most recent visit was 3 months ago.  Interim changes to follow up on today: no change in medication  Work/School Performance:  going well  Cognitive:  able to focus    Behavior  Hyperactivity: is not hyperactive  Impulsivity: no impulsivity  Tasking: able to initiate tasks and able to complete tasks    Social  ADHD social/impulsive symptoms:  not impatient and no excessive talking    Behavioral health  Behavior: no concerns  Emotional coping: demonstrates feelings of no concerns           Review of Systems   Constitutional: Negative.    HENT: Negative.     Eyes: Negative.    Respiratory: Negative.     Cardiovascular: Negative.    Gastrointestinal: Negative.    Endocrine: Negative.    Genitourinary: Negative.    Musculoskeletal: Negative.    Skin: Negative.    Allergic/Immunologic: Negative.    Neurological: Negative.    Hematological: Negative.    Psychiatric/Behavioral: Negative.         I have reviewed and agree with the HPI and ROS information as above.  Mellisa Grove, APRN     Objective   Vital Signs:   /71   Pulse 80   Ht 175.3 cm (69\")   Wt 74.4 kg (164 lb)   SpO2 99%   BMI 24.22 kg/m²     BMI is within normal parameters. No other follow-up for BMI required.      Physical Exam  Constitutional:       Appearance: Normal appearance. She is well-developed.   HENT:      Head: Normocephalic and atraumatic.      Right Ear: External ear normal.      Left Ear: External ear normal.      Nose: Nose normal. No nasal tenderness or congestion.      Mouth/Throat:      Lips: Pink. No lesions.      Mouth: Mucous membranes are moist. No oral lesions.      Dentition: Normal dentition.      Pharynx: Oropharynx is clear. No pharyngeal swelling, oropharyngeal exudate or posterior oropharyngeal erythema.   Eyes:    "   General: Lids are normal. Vision grossly intact. No scleral icterus.        Right eye: No discharge.         Left eye: No discharge.      Extraocular Movements: Extraocular movements intact.      Conjunctiva/sclera: Conjunctivae normal.      Right eye: Right conjunctiva is not injected.      Left eye: Left conjunctiva is not injected.      Pupils: Pupils are equal, round, and reactive to light.   Cardiovascular:      Rate and Rhythm: Normal rate and regular rhythm.      Heart sounds: Normal heart sounds. No murmur heard.     No gallop.   Pulmonary:      Effort: Pulmonary effort is normal.      Breath sounds: Normal breath sounds and air entry. No wheezing, rhonchi or rales.   Musculoskeletal:         General: No tenderness or deformity. Normal range of motion.      Cervical back: Full passive range of motion without pain, normal range of motion and neck supple.      Right lower leg: No edema.      Left lower leg: No edema.   Skin:     General: Skin is warm and dry.      Coloration: Skin is not jaundiced.      Findings: No rash.   Neurological:      Mental Status: She is alert and oriented to person, place, and time.      Sensory: Sensation is intact.      Motor: Motor function is intact.      Coordination: Coordination is intact.      Gait: Gait is intact.   Psychiatric:         Attention and Perception: Attention normal.         Mood and Affect: Mood and affect normal.         Behavior: Behavior is not hyperactive. Behavior is cooperative.         Thought Content: Thought content normal.         Judgment: Judgment normal.          CHRISTOFER-7:      PHQ-2 Depression Screening  Little interest or pleasure in doing things?     Feeling down, depressed, or hopeless?     PHQ-2 Total Score       PHQ-9 Depression Screening  Little interest or pleasure in doing things?     Feeling down, depressed, or hopeless?     Trouble falling or staying asleep, or sleeping too much?     Feeling tired or having little energy?     Poor  appetite or overeating?     Feeling bad about yourself - or that you are a failure or have let yourself or your family down?     Trouble concentrating on things, such as reading the newspaper or watching television?     Moving or speaking so slowly that other people could have noticed? Or the opposite - being so fidgety or restless that you have been moving around a lot more than usual?     Thoughts that you would be better off dead, or of hurting yourself in some way?     PHQ-9 Total Score     If you checked off any problems, how difficult have these problems made it for you to do your work, take care of things at home, or get along with other people?        Result Review  Data Reviewed:   The following data was reviewed by: KEIKO Hebert on 12/22/2023:  Urine Drug Screen - Urine, Clean Catch (09/21/2023 10:03)            Assessment and Plan      Diagnoses and all orders for this visit:    1. Attention deficit hyperactivity disorder (ADHD), predominantly inattentive type (Primary)      Pt here today for a 3 month adhd follow up. She states she is doing well with her current dose of Vyvanse. Denies any complaints or concerns. Wishes to continue the same. Nnamdi is clean. UDS is up to date and appropriate. Continue Vyvanse 50mg. F/u 3 months.       ADHD Follow up:    PDMP reviewed and is clean. ADRS (Adult ADHD Assessment Form) reviewed in detail, scanned in chart, and has been reviewed at time of appointment.  All questions, including medication and side effects, were discussed in detail at time of patient's visit. Patient will continue same medication which was discussed at today's visit. Patient is to return in 3 month(s).    Last Urine Toxicity  More data may exist         Latest Ref Rng & Units 9/21/2023 9/27/2022   LAST URINE TOXICITY RESULTS   Amphetamine, Urine Qual Negative Positive  Negative    Barbiturates Screen, Urine Negative Negative  Negative    Benzodiazepine Screen, Urine Negative Negative   Negative    Buprenorphine, Screen, Urine Negative Negative  Negative    Cocaine Screen, Urine Negative Negative  Negative    Fentanyl, Urine Negative Negative  -   Methadone Screen , Urine Negative Negative  Negative    Methamphetamine, Ur Negative Negative  Negative         Urine Drug Screen Results: UDS RESULTS: Current results appropriate      Follow Up   Return in about 3 months (around 3/22/2024) for Recheck.  Patient was given instructions and counseling regarding her condition or for health maintenance advice. Please see specific information pulled into the AVS if appropriate.

## 2024-01-05 DIAGNOSIS — F90.2 ATTENTION DEFICIT HYPERACTIVITY DISORDER (ADHD), COMBINED TYPE: ICD-10-CM

## 2024-01-05 RX ORDER — LISDEXAMFETAMINE DIMESYLATE CAPSULES 50 MG/1
50 CAPSULE ORAL EVERY MORNING
Qty: 30 CAPSULE | Refills: 0 | OUTPATIENT
Start: 2024-01-05 | End: 2024-02-04

## 2024-01-20 DIAGNOSIS — F90.2 ATTENTION DEFICIT HYPERACTIVITY DISORDER (ADHD), COMBINED TYPE: ICD-10-CM

## 2024-01-22 RX ORDER — LISDEXAMFETAMINE DIMESYLATE CAPSULES 50 MG/1
50 CAPSULE ORAL EVERY MORNING
Qty: 30 CAPSULE | Refills: 0 | OUTPATIENT
Start: 2024-01-22 | End: 2024-02-21

## 2024-01-30 ENCOUNTER — TELEPHONE (OUTPATIENT)
Dept: FAMILY MEDICINE CLINIC | Facility: CLINIC | Age: 34
End: 2024-01-30
Payer: COMMERCIAL

## 2024-01-30 DIAGNOSIS — F90.2 ATTENTION DEFICIT HYPERACTIVITY DISORDER (ADHD), COMBINED TYPE: ICD-10-CM

## 2024-01-30 NOTE — TELEPHONE ENCOUNTER
Caller: Myrtle Hurst    Relationship: Self    Best call back number: [phone]    Requested Prescriptions:   Requested Prescriptions     Pending Prescriptions Disp Refills    lisdexamfetamine (Vyvanse) 50 MG capsule 30 capsule 0     Sig: Take 1 capsule by mouth Every Morning for 30 days        Pharmacy where request should be sent: [facility] [address] [place] [address] - [address] AT [facility] - [phone] [facility] [phone]      Last office visit with prescribing clinician: 10/26/2022   Last telemedicine visit with prescribing clinician: Visit date not found   Next office visit with prescribing clinician: Visit date not found     Additional details provided by patient: [facility]THEY ARE STILL OUT OF MEDICATION; PATIENT HAS BEEN OUT OF MEDICATION FOR OVER A WEEK     Does the patient have less than a 3 day supply:  [x] Yes  [] No    Would you like a call back once the refill request has been completed: [] Yes [] No    If the office needs to give you a call back, can they leave a voicemail: [] Yes [] No    Kimberly Royal Rep   01/30/24 12:13 CST       [facility]-PATIENT HAS BEEN WITHOUT

## 2024-01-31 NOTE — TELEPHONE ENCOUNTER
Caller: Myrtle Hurst    Relationship: Self    Best call back number: [phone]     What is the best time to reach you: ANYTIME    Who are you requesting to speak with (clinical staff, provider,  specific staff member): CLINICAL    What was the call regarding: PLEASE CALL BACK WHEN THIS HAS BEEN CALLED IN TO [facility]MAR.     Is it okay if the provider responds through [facility] [employer]: YES

## 2024-02-02 RX ORDER — LISDEXAMFETAMINE DIMESYLATE CAPSULES 50 MG/1
50 CAPSULE ORAL EVERY MORNING
Qty: 30 CAPSULE | Refills: 0 | Status: SHIPPED | OUTPATIENT
Start: 2024-02-02 | End: 2024-03-03

## 2024-03-04 DIAGNOSIS — F90.2 ATTENTION DEFICIT HYPERACTIVITY DISORDER (ADHD), COMBINED TYPE: ICD-10-CM

## 2024-03-04 RX ORDER — LISDEXAMFETAMINE DIMESYLATE CAPSULES 50 MG/1
50 CAPSULE ORAL EVERY MORNING
Qty: 30 CAPSULE | Refills: 0 | Status: SHIPPED | OUTPATIENT
Start: 2024-03-04 | End: 2024-03-08 | Stop reason: RX

## 2024-03-04 NOTE — TELEPHONE ENCOUNTER
Pt last seen 12/22/23. Pt was ok'd for 3 months.  Refill: 3rd  Date of last script: 2/2/24  UDS 9/21/23  F/u apt:  3/22/24  Routing to Dr. Castro to review and sign.

## 2024-03-05 DIAGNOSIS — F90.2 ATTENTION DEFICIT HYPERACTIVITY DISORDER (ADHD), COMBINED TYPE: ICD-10-CM

## 2024-03-05 NOTE — TELEPHONE ENCOUNTER
Caller: Hurst Myrtle    Relationship: Self    Best call back number: [phone]     Requested Prescriptions:   Requested Prescriptions     Pending Prescriptions Disp Refills    lisdexamfetamine (Vyvanse) 50 MG capsule 30 capsule 0     Sig: Take 1 capsule by mouth Every Morning for 30 days        Pharmacy where request should be sent: [id number] - [place], [place] - [address] AT ACROSS FROM [facility] [phone] [facility] [phone]      Last office visit with prescribing clinician: 10/26/2022   Last telemedicine visit with prescribing clinician: Visit date not found   Next office visit with prescribing clinician: Visit date not found     Additional details provided by patient: PATIENT HAS ONE DAY LEFT.    Does the patient have less than a 3 day supply:  [x] Yes  [] No    Would you like a call back once the refill request has been completed: [] Yes [x] No    If the office needs to give you a call back, can they leave a voicemail: [] Yes [x] No    Kimberly Aquino Rep   03/05/24 12:30 CST

## 2024-03-06 RX ORDER — LISDEXAMFETAMINE DIMESYLATE CAPSULES 50 MG/1
50 CAPSULE ORAL EVERY MORNING
Qty: 30 CAPSULE | Refills: 0 | OUTPATIENT
Start: 2024-03-06 | End: 2024-04-05

## 2024-03-06 NOTE — TELEPHONE ENCOUNTER
Rx Refill Note  Requested Prescriptions     Pending Prescriptions Disp Refills    lisdexamfetamine (Vyvanse) 50 MG capsule 30 capsule 0     Sig: Take 1 capsule by mouth Every Morning for 30 days      Last office visit with prescribing clinician: 10/26/2022   Last telemedicine visit with prescribing clinician: Visit date not found   Next office visit with prescribing clinician: Visit date not found       DUPLICATE filled on 03-04-24 by Dr. Castro  Received at pharmacy on 03-04-24 @ 2:54         Cathy Lake MA  03/06/24, 07:57 CST

## 2024-03-08 DIAGNOSIS — F90.2 ATTENTION DEFICIT HYPERACTIVITY DISORDER (ADHD), COMBINED TYPE: ICD-10-CM

## 2024-03-08 RX ORDER — LISDEXAMFETAMINE DIMESYLATE CAPSULES 50 MG/1
50 CAPSULE ORAL EVERY MORNING
Qty: 30 CAPSULE | Refills: 0 | OUTPATIENT
Start: 2024-03-08 | End: 2024-04-07

## 2024-03-08 NOTE — TELEPHONE ENCOUNTER
Pt requesting pharmacy change d/t availability. Contacted [facility] pharmacy and canceled vyvanse sent on 3/4/24. Pt last seen 12/22/23.  Refill: 3rd  Date of last script: 2/2/24  UDS 9/21/23  F/u apt: 3/22/24  Routing to Dr. Moraes to review and sign.

## 2024-03-25 ENCOUNTER — OFFICE VISIT (OUTPATIENT)
Dept: FAMILY MEDICINE CLINIC | Facility: CLINIC | Age: 34
End: 2024-03-25

## 2024-03-25 VITALS
HEART RATE: 83 BPM | HEIGHT: 69 IN | BODY MASS INDEX: 24.73 KG/M2 | SYSTOLIC BLOOD PRESSURE: 113 MMHG | WEIGHT: 167 LBS | OXYGEN SATURATION: 100 % | TEMPERATURE: 99.1 F | DIASTOLIC BLOOD PRESSURE: 74 MMHG

## 2024-03-25 DIAGNOSIS — F90.0 ATTENTION DEFICIT HYPERACTIVITY DISORDER (ADHD), PREDOMINANTLY INATTENTIVE TYPE: Primary | ICD-10-CM

## 2024-03-25 DIAGNOSIS — J40 BRONCHITIS: ICD-10-CM

## 2024-03-25 DIAGNOSIS — J04.0 LARYNGITIS: ICD-10-CM

## 2024-03-25 PROCEDURE — 96372 THER/PROPH/DIAG INJ SC/IM: CPT | Performed by: NURSE PRACTITIONER

## 2024-03-25 PROCEDURE — 99214 OFFICE O/P EST MOD 30 MIN: CPT | Performed by: NURSE PRACTITIONER

## 2024-03-25 RX ORDER — DEXTROMETHORPHAN HYDROBROMIDE AND PROMETHAZINE HYDROCHLORIDE 15; 6.25 MG/5ML; MG/5ML
5 SYRUP ORAL
Qty: 118 ML | Refills: 0 | Status: SHIPPED | OUTPATIENT
Start: 2024-03-25

## 2024-03-25 RX ORDER — METHYLPREDNISOLONE 4 MG/1
TABLET ORAL
Qty: 21 TABLET | Refills: 0 | Status: SHIPPED | OUTPATIENT
Start: 2024-03-25

## 2024-03-25 RX ORDER — LISDEXAMFETAMINE DIMESYLATE CAPSULES 50 MG/1
50 CAPSULE ORAL EVERY MORNING
Qty: 30 CAPSULE | Refills: 0 | Status: SHIPPED | OUTPATIENT
Start: 2024-05-18 | End: 2024-06-17

## 2024-03-25 RX ORDER — ALBUTEROL SULFATE 2.5 MG/3ML
2.5 SOLUTION RESPIRATORY (INHALATION) EVERY 4 HOURS PRN
Qty: 100 ML | Refills: 0 | Status: SHIPPED | OUTPATIENT
Start: 2024-03-25

## 2024-03-25 RX ORDER — LISDEXAMFETAMINE DIMESYLATE CAPSULES 50 MG/1
50 CAPSULE ORAL EVERY MORNING
Qty: 30 CAPSULE | Refills: 0 | Status: SHIPPED | OUTPATIENT
Start: 2024-04-19 | End: 2024-05-19

## 2024-03-25 RX ORDER — DEXAMETHASONE SODIUM PHOSPHATE 4 MG/ML
8 INJECTION, SOLUTION INTRA-ARTICULAR; INTRALESIONAL; INTRAMUSCULAR; INTRAVENOUS; SOFT TISSUE ONCE
Status: COMPLETED | OUTPATIENT
Start: 2024-03-25 | End: 2024-03-25

## 2024-03-25 RX ORDER — AZITHROMYCIN 250 MG/1
TABLET, FILM COATED ORAL
Qty: 6 TABLET | Refills: 0 | Status: SHIPPED | OUTPATIENT
Start: 2024-03-25

## 2024-03-25 RX ADMIN — DEXAMETHASONE SODIUM PHOSPHATE 8 MG: 4 INJECTION, SOLUTION INTRA-ARTICULAR; INTRALESIONAL; INTRAMUSCULAR; INTRAVENOUS; SOFT TISSUE at 11:08

## 2024-03-25 NOTE — PROGRESS NOTES
After obtaining consent, and per orders of Iris Lock, injection of dexAMETHasone (DECADRON) injection 8 mg  given by Susan Baig MA. Patient instructed to remain in clinic for 20 minutes afterwards, and to report any adverse reaction to me immediately.     Pt tolerated well

## 2024-03-25 NOTE — PROGRESS NOTES
"Chief Complaint  ADHD, Sore Throat, Fever, Cough, Generalized Body Aches, and Headache    Subjective    History of Present Illness      Patient presents to [facility] for   History of Present Illness  ADHD/Mood HPI    Visit for:  follow-up. Most recent visit was 3 months ago.  Interim changes to follow up on today: no change in medication  Work/School Performance:  going well and improving  Cognitive:  able to focus    Behavior  Hyperactivity: is not hyperactive  Impulsivity: no impulsivity and no unsafe behavior  Tasking: able to initiate tasks and able to complete tasks    Social  ADHD social/impulsive symptoms:  not impatient and no excessive talking    Behavioral health  Behavior: no concerns  Emotional coping: demonstrates feelings of no concerns    Pt presents today for 3 month follow up on ADHD. Pt tolerating well, no problems or concerns at this time    Pt also has Sore Throat, Fever, Cough, Generalized Body Aches, and Headache, ongoing the last 3-4 days and tried cough drops. States she is a  and some of the players been sick       Review of Systems    I have reviewed and agree with the HPI and ROS information as above.  Iris Ibarra, KEIKO     Objective   Vital Signs:   /74   Pulse 83   Temp 99.1 °F (37.3 °C) (Infrared)   Ht 175.3 cm (69.02\")   Wt 75.8 kg (167 lb)   SpO2 100%   BMI 24.65 kg/m²     BMI is within normal parameters. No other follow-up for BMI required.      Physical Exam  Constitutional:       Appearance: Normal appearance. She is well-developed.   HENT:      Head: Normocephalic and atraumatic.      Right Ear: Tympanic membrane, ear canal and external ear normal.      Left Ear: Tympanic membrane, ear canal and external ear normal.      Nose: Nose normal. No septal deviation, nasal tenderness or congestion.      Mouth/Throat:      Lips: Pink. No lesions.      Mouth: Mucous membranes are moist. No oral lesions.      Dentition: Normal " dentition.      Pharynx: Oropharynx is clear. No pharyngeal swelling, oropharyngeal exudate or posterior oropharyngeal erythema.      Comments: Laryngitis heard, hoarse   Eyes:      General: Lids are normal. Vision grossly intact. No scleral icterus.        Right eye: No discharge.         Left eye: No discharge.      Extraocular Movements: Extraocular movements intact.      Conjunctiva/sclera: Conjunctivae normal.      Right eye: Right conjunctiva is not injected.      Left eye: Left conjunctiva is not injected.      Pupils: Pupils are equal, round, and reactive to light.   Neck:      Thyroid: No thyroid mass.      Trachea: Trachea normal.   Cardiovascular:      Rate and Rhythm: Normal rate and regular rhythm.      Heart sounds: Normal heart sounds. No murmur heard.     No gallop.   Pulmonary:      Effort: Pulmonary effort is normal.      Breath sounds: Decreased air movement present. Decreased breath sounds present. No wheezing, rhonchi or rales.   Abdominal:      General: There is no distension.      Palpations: Abdomen is soft. There is no mass.      Tenderness: There is no abdominal tenderness. There is no right CVA tenderness, left CVA tenderness, guarding or rebound.   Musculoskeletal:         General: No tenderness or deformity. Normal range of motion.      Cervical back: Full passive range of motion without pain, normal range of motion and neck supple.      Thoracic back: Normal.      Right lower leg: No edema.      Left lower leg: No edema.   Skin:     General: Skin is warm and dry.      Coloration: Skin is not jaundiced.      Findings: No rash.   Neurological:      Mental Status: She is alert and oriented to person, place, and time.      Sensory: Sensation is intact.      Motor: Motor function is intact.      Coordination: Coordination is intact.      Gait: Gait is intact.      Deep Tendon Reflexes: Reflexes are normal and symmetric.   Psychiatric:         Mood and Affect: Mood and affect normal.          Judgment: Judgment normal.              PHQ-2 Depression Screening  Little interest or pleasure in doing things? 0-->not at all   Feeling down, depressed, or hopeless? 0-->not at all   PHQ-2 Total Score 0     PHQ-9 Depression Screening  Little interest or pleasure in doing things? 0-->not at all   Feeling down, depressed, or hopeless? 0-->not at all   Trouble falling or staying asleep, or sleeping too much?     Feeling tired or having little energy?     Poor appetite or overeating?     Feeling bad about yourself - or that you are a failure or have let yourself or your family down?     Trouble concentrating on things, such as reading the newspaper or watching television?     Moving or speaking so slowly that other people could have noticed? Or the opposite - being so fidgety or restless that you have been moving around a lot more than usual?     Thoughts that you would be better off dead, or of hurting yourself in some way?     PHQ-9 Total Score 0   If you checked off any problems, how difficult have these problems made it for you to do your work, take care of things at home, or get along with other people?        Result Review  Data Reviewed:                   Assessment and Plan      Diagnoses and all orders for this visit:    1. Attention deficit hyperactivity disorder (ADHD), predominantly inattentive type (Primary)  Comments:  Stable on vyvanse 50mg, continue same. UDS and csa both utd.    2. Bronchitis  Comments:  Cough, fever, sob. Treat acutely.  Orders:  -     azithromycin (Zithromax Z-Tristin) 250 MG tablet; Take 2 tablets by mouth on day 1, then 1 tablet daily on days 2-5  Dispense: 6 tablet; Refill: 0  -     dexAMETHasone (DECADRON) injection 8 mg  -     methylPREDNISolone (MEDROL) 4 MG dose pack; Take as directed on package instructions.  Dispense: 21 tablet; Refill: 0  -     promethazine-dextromethorphan (PROMETHAZINE-DM) 6.25-15 MG/5ML syrup; Take 5 mL by mouth every night at bedtime.  Dispense: 118 mL;  Refill: 0  -     Home Nebulizer  -     albuterol (PROVENTIL) (2.5 MG/3ML) 0.083% nebulizer solution; Take 2.5 mg by nebulization Every 4 (Four) Hours As Needed for Wheezing or Shortness of Air.  Dispense: 100 mL; Refill: 0    3. Laryngitis      ADHD Follow up:    PDMP reviewed and pending. ADRS (Adult ADHD Assessment Form) reviewed in detail, scanned in chart, and has been reviewed at time of appointment.  All questions, including medication and side effects, were discussed in detail at time of patient's visit. Patient will continue same medication which was discussed at today's visit. Patient is to return in 3 month(s).    Last Urine Toxicity  More data may exist         Latest Ref Rng & Units 9/21/2023 9/27/2022   LAST URINE TOXICITY RESULTS   Amphetamine, Urine Qual Negative Positive  Negative    Barbiturates Screen, Urine Negative Negative  Negative    Benzodiazepine Screen, Urine Negative Negative  Negative    Buprenorphine, Screen, Urine Negative Negative  Negative    Cocaine Screen, Urine Negative Negative  Negative    Fentanyl, Urine Negative Negative  -   Methadone Screen , Urine Negative Negative  Negative    Methamphetamine, Ur Negative Negative  Negative         Urine Drug Screen Results: UDS RESULTS: Current results appropriate          Follow Up   Return in about 3 months (around 6/25/2024).  Patient was given instructions and counseling regarding her condition or for health maintenance advice. Please see specific information pulled into the AVS if appropriate.

## 2024-03-25 NOTE — PATIENT INSTRUCTIONS

## 2024-04-18 DIAGNOSIS — F90.2 ATTENTION DEFICIT HYPERACTIVITY DISORDER (ADHD), COMBINED TYPE: ICD-10-CM

## 2024-04-18 RX ORDER — LISDEXAMFETAMINE DIMESYLATE CAPSULES 50 MG/1
50 CAPSULE ORAL EVERY MORNING
Qty: 30 CAPSULE | Refills: 0 | OUTPATIENT
Start: 2024-04-18 | End: 2024-05-18

## 2024-04-18 NOTE — TELEPHONE ENCOUNTER
Rx Refill Note  Requested Prescriptions     Refused Prescriptions Disp Refills    lisdexamfetamine (Vyvanse) 50 MG capsule 30 capsule 0     Sig: Take 1 capsule by mouth Every Morning for 30 days      Last office visit with prescribing clinician: Office Visit with Iris Ibarra APRN (03/25/2024)    Last telemedicine visit with prescribing clinician: Visit date not found   Next office visit with prescribing clinician: Appointment with Iris Ibarra APRN (06/26/2024)     Future refill will become available tomorrow (4/19/24)    Maximiliano Vaughn MA  04/18/24, 09:34 CDT

## 2024-05-28 DIAGNOSIS — F90.0 ATTENTION DEFICIT HYPERACTIVITY DISORDER (ADHD), PREDOMINANTLY INATTENTIVE TYPE: ICD-10-CM

## 2024-05-28 RX ORDER — LISDEXAMFETAMINE DIMESYLATE 50 MG/1
50 CAPSULE ORAL EVERY MORNING
Qty: 30 CAPSULE | Refills: 0 | OUTPATIENT
Start: 2024-05-28 | End: 2024-06-27

## 2024-07-19 ENCOUNTER — OFFICE VISIT (OUTPATIENT)
Dept: FAMILY MEDICINE CLINIC | Facility: CLINIC | Age: 34
End: 2024-07-19

## 2024-07-19 DIAGNOSIS — F90.0 ATTENTION DEFICIT HYPERACTIVITY DISORDER (ADHD), PREDOMINANTLY INATTENTIVE TYPE: Primary | ICD-10-CM

## 2024-07-19 DIAGNOSIS — F41.9 ANXIETY: ICD-10-CM

## 2024-07-19 PROCEDURE — 99214 OFFICE O/P EST MOD 30 MIN: CPT | Performed by: NURSE PRACTITIONER

## 2024-07-22 VITALS
BODY MASS INDEX: 23.99 KG/M2 | SYSTOLIC BLOOD PRESSURE: 120 MMHG | HEART RATE: 74 BPM | WEIGHT: 162 LBS | HEIGHT: 69 IN | DIASTOLIC BLOOD PRESSURE: 85 MMHG

## 2024-07-22 DIAGNOSIS — F90.0 ATTENTION DEFICIT HYPERACTIVITY DISORDER (ADHD), PREDOMINANTLY INATTENTIVE TYPE: Primary | ICD-10-CM

## 2024-07-22 RX ORDER — DEXTROAMPHETAMINE SACCHARATE, AMPHETAMINE ASPARTATE, DEXTROAMPHETAMINE SULFATE AND AMPHETAMINE SULFATE 2.5; 2.5; 2.5; 2.5 MG/1; MG/1; MG/1; MG/1
10 TABLET ORAL DAILY
Qty: 30 TABLET | Refills: 0 | Status: SHIPPED | OUTPATIENT
Start: 2024-07-22

## 2024-07-22 RX ORDER — LISDEXAMFETAMINE DIMESYLATE 50 MG/1
50 CAPSULE ORAL EVERY MORNING
Qty: 30 CAPSULE | Refills: 0 | Status: SHIPPED | OUTPATIENT
Start: 2024-07-22

## 2024-07-22 NOTE — PROGRESS NOTES
"Chief Complaint  ADHD and Anxiety    Subjective    History of Present Illness      Patient presents to [facility] for   History of Present Illness  ADHD-her current dose is not lasting long enough. She has a new jobs and feels she may need a dose adjustment. Also with this new job have noticed more anxiety and stressors. Wants to restart buspar, was prev on 10mg and did well.        Review of Systems    I have reviewed and agree with the HPI and ROS information as above.  KEIKO Fine     Objective   Vital Signs:   /85   Pulse 74   Ht 175.3 cm (69\")   Wt 73.5 kg (162 lb)   BMI 23.92 kg/m²     BMI is within normal parameters. No other follow-up for BMI required.      Physical Exam  Constitutional:       Appearance: Normal appearance. She is well-developed.   HENT:      Head: Normocephalic and atraumatic.      Right Ear: Tympanic membrane, ear canal and external ear normal.      Left Ear: Tympanic membrane, ear canal and external ear normal.      Nose: Nose normal. No septal deviation, nasal tenderness or congestion.      Mouth/Throat:      Lips: Pink. No lesions.      Mouth: Mucous membranes are moist. No oral lesions.      Dentition: Normal dentition.      Pharynx: Oropharynx is clear. No pharyngeal swelling, oropharyngeal exudate or posterior oropharyngeal erythema.   Eyes:      General: Lids are normal. Vision grossly intact. No scleral icterus.        Right eye: No discharge.         Left eye: No discharge.      Extraocular Movements: Extraocular movements intact.      Conjunctiva/sclera: Conjunctivae normal.      Right eye: Right conjunctiva is not injected.      Left eye: Left conjunctiva is not injected.      Pupils: Pupils are equal, round, and reactive to light.   Neck:      Thyroid: No thyroid mass.      Trachea: Trachea normal.   Cardiovascular:      Rate and Rhythm: Normal rate and regular rhythm.      Heart sounds: Normal heart sounds. No murmur heard.     " No gallop.   Pulmonary:      Effort: Pulmonary effort is normal.      Breath sounds: Normal breath sounds and air entry. No wheezing, rhonchi or rales.   Abdominal:      General: There is no distension.      Palpations: Abdomen is soft. There is no mass.      Tenderness: There is no abdominal tenderness. There is no right CVA tenderness, left CVA tenderness, guarding or rebound.   Musculoskeletal:         General: No tenderness or deformity. Normal range of motion.      Cervical back: Full passive range of motion without pain, normal range of motion and neck supple.      Thoracic back: Normal.      Right lower leg: No edema.      Left lower leg: No edema.   Skin:     General: Skin is warm and dry.      Coloration: Skin is not jaundiced.      Findings: No rash.   Neurological:      Mental Status: She is alert and oriented to person, place, and time.      Sensory: Sensation is intact.      Motor: Motor function is intact.      Coordination: Coordination is intact.      Gait: Gait is intact.      Deep Tendon Reflexes: Reflexes are normal and symmetric.   Psychiatric:         Mood and Affect: Mood and affect normal.         Judgment: Judgment normal.             Result Review  Data Reviewed:                   Assessment and Plan      Diagnoses and all orders for this visit:    1. Attention deficit hyperactivity disorder (ADHD), predominantly inattentive type (Primary)  Comments:  Cont Vyvanse 50mg and add afternoon adderall IR 10mg.    2. Anxiety  Comments:  Restart buspar.    ADHD Follow up:    PDMP reviewed and pending. ADRS (Adult ADHD Assessment Form) reviewed in detail, scanned in chart, and has been reviewed at time of appointment.  All questions, including medication and side effects, were discussed in detail at time of patient's visit. Patient will change medication dose which was discussed at today's visit. Patient is to return in 1 month(s).    Last Urine Toxicity  More data may exist         Latest Ref Rng &  Units 9/21/2023 9/27/2022   LAST URINE TOXICITY RESULTS   Amphetamine, Urine Qual Negative Positive  Negative    Barbiturates Screen, Urine Negative Negative  Negative    Benzodiazepine Screen, Urine Negative Negative  Negative    Buprenorphine, Screen, Urine Negative Negative  Negative    Cocaine Screen, Urine Negative Negative  Negative    Fentanyl, Urine Negative Negative  -   Methadone Screen , Urine Negative Negative  Negative    Methamphetamine, Ur Negative Negative  Negative         Urine Drug Screen Results: UDS RESULTS: Current results appropriate    ++buspar called into pharmacy due to system outage.         Follow Up   Return in about 1 month (around 8/19/2024).  Patient was given instructions and counseling regarding her condition or for health maintenance advice. Please see specific information pulled into the AVS if appropriate.

## 2024-08-20 DIAGNOSIS — F90.0 ATTENTION DEFICIT HYPERACTIVITY DISORDER (ADHD), PREDOMINANTLY INATTENTIVE TYPE: ICD-10-CM

## 2024-08-20 RX ORDER — LISDEXAMFETAMINE DIMESYLATE 50 MG/1
50 CAPSULE ORAL EVERY MORNING
Qty: 30 CAPSULE | Refills: 0 | Status: SHIPPED | OUTPATIENT
Start: 2024-08-20 | End: 2024-09-19

## 2024-08-20 NOTE — TELEPHONE ENCOUNTER
Caller: Myrtle Hurst    Relationship: Self    Best call back number: [phone]     Requested Prescriptions:   Requested Prescriptions     Pending Prescriptions Disp Refills    lisdexamfetamine (Vyvanse) 50 MG capsule 30 capsule 0     Sig: Take 1 capsule by mouth Every Morning        Pharmacy where request should be sent: [facility]/PHARMACY #[id number] - [place], [place] - [address] AT ACROSS FROM [facility] [phone] [facility] [phone]      Last office visit with prescribing clinician: 7/19/2024   Last telemedicine visit with prescribing clinician: Visit date not found   Next office visit with prescribing clinician: Visit date not found     Additional details provided by patient:     Does the patient have less than a 3 day supply:  [x] Yes  [] No    Would you like a call back once the refill request has been completed: [] Yes [] No    If the office needs to give you a call back, can they leave a voicemail: [] Yes [] No    Kimberly Saldaña Rep   08/20/24 10:14 CDT

## 2024-08-20 NOTE — TELEPHONE ENCOUNTER
Pt last seen 7/19/24.  Refill: 2nd  Date of last script: 7/22/24   UDS 9/21/24  Routing to Dr. Parker to reivew

## 2024-08-22 ENCOUNTER — E-VISIT (OUTPATIENT)
Dept: FAMILY MEDICINE CLINIC | Facility: TELEHEALTH | Age: 34
End: 2024-08-22

## 2024-08-22 NOTE — E-VISIT TREATED
Date: 2024 07:50:01  Clinician: Khadijah Schroeder  Clinician NPI: [id number]  Patient: Myrtle Hurst  Patient : 1990  Patient Address: [facility] [place] [address] [place] [address]  Patient Phone: [phone]  Visit Protocol: UTI  Patient Summary:  Myrtle is a 33 year old ( : 1990 ) female who initiated a visit for a presumed bladder infection.    The symptoms started 1-3 days ago and consist of dysuria, foul-smelling urine, urinary frequency, and urgency.   Symptom   details   Urine color: Yellow    Denied symptoms include nausea, flank pain, abdominal pain, vaginal discharge, vomiting, urinary incontinence, chills, feeling as if the bladder is never empty, and vaginal itching. Myrtle does not feel feverish.     Myrtle has used over-the-counter medications or home remedies to relieve the current symptoms.  Precipitating events  Myrtle denies having a sexually transmitted disease.  Pertinent medical history  Myrtle has had a bladder infection before but has   not had any in the past 12 months. The current symptoms are similar to previous bladder infection symptoms.   Myrtle is not sure what antibiotics have been effective in treating past bladder infections.   Myrtle does not get a yeast infection when   taking antibiotics.   Myrtle does not get yeast infections when taking antibiotics and has not been prescribed antibiotics to prevent frequent or repeated bladder infections in the past. Myrtle has not experienced problems or side effects with any of   the common antibiotics used to treat bladder infections.   Myrtle does not have a history of kidney stones. Myrtle has not had a procedure or surgery done to the urinary tract. Myrtle has not been diagnosed with advanced kidney disease.   Myrtle has   not used a catheter and denies being a patient in a hospital or nursing home in the past 2 weeks. Myrtle does not have diabetes. Immunosuppressive conditions (e.g., chemotherapy, HIV, organ  transplant, long-term use of steroids or other   immunosuppressive medications, splenectomy) were denied.   Myrtle does not smoke or use smokeless tobacco. Myrtle does not vape or use other e-cigarette products.   Myrtle denies pregnancy and denies breastfeeding.     MEDICATIONS: albuterol sulfate inhalation, lisdexamfetamine oral, dexamethasone sodium phosphate injection, choriogonadotropin rigoberto,human recombinant subcutaneous, Ovidrel subcutaneous, albuterol sulfate inhalation, lisdexamfetamine oral, cetirizine   oral, ALLERGIES: NKDA  Clinician Response:  Dear Myrtle,  Based on the information you have provided, you have an acute urinary tract infection, also called a bladder infection. Bladder infections occur when bacteria from the outside of the body enters the urinary tract. Any   part of the urinary system can be infected, but the bladder is the most common.  Medication information  I am prescribing:     Nitrofurantoin monohyd/m-cryst (Macrobid) 100 mg oral capsule. Take 1 capsule by mouth every 12 hours for 5 days. Take this medication with food. There are no refills with this prescription.   The medication I prescribed for your bladder infection is an   antibiotic. Continue taking the medication until it is gone even if you feel better.   Yeast infections can be a common side effect of antibiotics. The most common symptom of a yeast infection is itchiness in and around the vagina. Other signs and   symptoms include burning, redness of the vulva (the outer part of the female genitals), or a thick, white vaginal discharge that looks like cottage cheese and does not have a bad smell.  Self care  Urination helps to flush bacteria from the urinary   tract. For this reason, drinking water and urinating often helps relieve some urinary symptoms and can decrease your risk of getting bladder infections in the future.  Other steps you can take to prevent future bladder infections include:     Wipe front to back  after using the bathroom    Urinate after sexual intercourse    Avoid using deodorant sprays, douches, or powders in the vaginal area     When to seek care  Please make an appointment to be seen in a clinic or urgent care if any of the following occur:     You develop new symptoms or your symptoms become worse    You have medication side effects that make it difficult to take them as prescribed    Your symptoms do not improve within 1-2 days of starting treatment    You have symptoms of a bladder infection that return shortly after completing treatment     It is possible to have an allergic reaction to an antibiotic even if you have not had one in the past. If you notice a new rash, significant swelling, or difficulty breathing, stop taking this medication immediately and go to a clinic or urgent care.   Diagnosis: Acute uncomplicated bladder infection  Diagnosis ICD: N39.0    Follow up instructions:  ATTENTION: If you have been prescribed medications, your prescriptions will not be sent until you choose your pharmacy. To do so open the link within your notification, or go to [employer]uxe and click eVisit in the menu to open your   treatment plan. From there, you can select your pharmacy at the bottom of your after visit summary. You can also go to [email or web address]   Prescriptions  Prescription: nitrofurantoin monohyd/m-cryst (Macrobid) 100 mg oral capsule, take 1 capsule by mouth every 12 hours for 5 days  Sent To: [facility]pharmacy #[id number] - [phone] - [address]  [place] [address]

## 2024-09-16 RX ORDER — BUSPIRONE HYDROCHLORIDE 10 MG/1
10 TABLET ORAL 3 TIMES DAILY PRN
Qty: 90 TABLET | Refills: 1 | Status: SHIPPED | OUTPATIENT
Start: 2024-09-16

## 2024-10-17 DIAGNOSIS — F90.0 ATTENTION DEFICIT HYPERACTIVITY DISORDER (ADHD), PREDOMINANTLY INATTENTIVE TYPE: ICD-10-CM

## 2024-10-17 DIAGNOSIS — F90.2 ATTENTION DEFICIT HYPERACTIVITY DISORDER (ADHD), COMBINED TYPE: ICD-10-CM

## 2024-10-17 RX ORDER — LISDEXAMFETAMINE DIMESYLATE 50 MG/1
50 CAPSULE ORAL EVERY MORNING
Qty: 30 CAPSULE | Refills: 0 | Status: SHIPPED | OUTPATIENT
Start: 2024-10-17 | End: 2024-11-16

## 2024-10-17 RX ORDER — LISDEXAMFETAMINE DIMESYLATE 50 MG/1
50 CAPSULE ORAL EVERY MORNING
Qty: 30 CAPSULE | Refills: 0 | Status: CANCELLED | OUTPATIENT
Start: 2024-10-17 | End: 2024-11-16

## 2024-10-17 NOTE — TELEPHONE ENCOUNTER
Rx Refill Note  Requested Prescriptions     Pending Prescriptions Disp Refills    lisdexamfetamine (Vyvanse) 50 MG capsule 30 capsule 0     Sig: Take 1 capsule by mouth Every Morning for 30 days      Last office visit with prescribing clinician: 7/19/2024   Last telemedicine visit with prescribing clinician: Visit date not found   Next office visit with prescribing clinician: Visit date not found    2nd    Maximiliano Vaughn MA  10/17/24, 13:03 CDT

## 2024-10-17 NOTE — TELEPHONE ENCOUNTER
Caller: HurstMyrtle nolasco    Relationship: Self    Best call back number: [phone]     Requested Prescriptions:   Requested Prescriptions     Pending Prescriptions Disp Refills    lisdexamfetamine (Vyvanse) 50 MG capsule 30 capsule 0     Sig: Take 1 capsule by mouth Every Morning for 30 days    lisdexamfetamine (Vyvanse) 50 MG capsule 30 capsule 0     Sig: Take 1 capsule by mouth Every Morning for 30 days        Pharmacy where request should be sent: [facility]/PHARMACY #[id number] - [place], [place] - [address] AT [address]CROSS FROM [address]  [phone] [facility] [phone]      Last office visit with prescribing clinician: 7/19/2024   Last telemedicine visit with prescribing clinician: Visit date not found   Next office visit with prescribing clinician: Visit date not found     Additional details provided by patient:     Does the patient have less than a 3 day supply:  [x] Yes  [] No    Would you like a call back once the refill request has been completed: [x] Yes [] No      Kimberly Lester Rep   10/17/24 10:08 T

## 2025-01-14 ENCOUNTER — E-VISIT (OUTPATIENT)
Dept: FAMILY MEDICINE CLINIC | Facility: TELEHEALTH | Age: 35
End: 2025-01-14

## 2025-01-14 PROCEDURE — FABRICHEALTHVISIT: Performed by: NURSE PRACTITIONER

## 2025-01-15 NOTE — E-VISIT TREATED
Date: 2025 20:05:46  Clinician: Haylee Xiao  Clinician NPI: [id number]  Patient: Myrtle Hurst  Patient : 1990  Patient Address: [facility] [address], [place] [address]  Patient Phone: [phone]  Visit Protocol: Bacterial vaginosis  Patient Summary:  Myrtle is a 34 year old ( : 1990 ) female who initiated a visit for bacterial vaginosis.     Myrtle began noticing vaginal discharge more than 2 weeks ago. The discharge has a fishy smell.    Symptom details   Vaginal   discharge: The discharge is gray in color and is thin in appearance.    Myrtle denies having pelvic pain, pain during or after sex, increased urinary frequency, increased urgency to urinate, vaginal burning, vaginal pruritus, abdominal pain, genital   lesions, and bleeding during or after sex. Myrtle also denies feeling feverish.   Myrtle reports they have not taken any medications for this current episode.   Precipitating events   Myrtle reports they have been sexually active and have not had new   or multiple partners in the past 90 days.   Myrtle reports their partner(s) have not experienced genital symptoms in the past 90 days.   Myrtle denies having an IUD placed in the last 60 days, the use of vaginal lubricants, and the use of vaginal   hygiene products.   Pertinent medical history  Myrtle has a history of bacterial vaginosis. Myrtle has had 0 occurrences in the past year.   Myrtle denies taking antibiotics in the past 2 weeks. Preferred medication route: Pill   Myrtle denies having   a sexually transmitted infection.   Myrtle has not had a yeast infection in the past.   STI testing status: Last tested 0-1 year ago and reports they do not have an STI.   Myrtle does not have diabetes.   Myrtle has not had pelvic surgery or a   procedure in the past 6 weeks.   Myrtle denies having immunosuppressive conditions (e.g., chemotherapy, HIV, organ transplant, long-term use of steroids or other  "immunosuppressive medications, splenectomy).   Myrtle does not smoke or use smokeless   tobacco. Myrtle does not vape or use other e-cigarette products.   Myrtle denies pregnancy and denies breastfeeding.     MEDICATIONS: lisdexamfetamine oral, buspirone oral, albuterol sulfate inhalation, lisdexamfetamine oral, dexamethasone sodium phosphate injection, choriogonadotropin rigoberto,human recombinant subcutaneous, Ovidrel subcutaneous, albuterol sulfate   inhalation, cetirizine oral, ALLERGIES: NKDA  Clinician Response:  Dear Myrtle,  Based on the information you have provided, you have bacterial vaginosis. Bacterial vaginosis is the most common vaginal infection among women of childbearing age. This condition is notorious for causing severe   complications related to the reproductive health of women. Bacterial vaginosis can occur when the usual balance of \"good\" and \"bad\" bacteria that reside in the vagina is disrupted with an overgrowth of the bad bacteria.   Medication information  I am   prescribing:     Metronidazole 500 mg oral tablet. Take 1 tablet by mouth every 12 hours for 7 days. There are no refills with this prescription.   Complete your entire course of medication, even if the symptoms go away. If you stop early, the infection could come back.    Self care  Personal lubricants containing chlorhexidine gluconate (Conceptrol(r), K-Y Jelly, and Surgilube(r)) significantly inhibit the good bacteria, allowing bad bacteria to grow, leading to bacterial vaginosis. Lubricants such as Astroglide(r)   Liquid, Good Clean Love Almost Naked, and K-Y Warming Jelly do not cause this effect.      Steps you can take to prevent bacterial vaginosis:     Taking oral probiotics (especially Lactobacillus rhamnosus and Lactobacillus reuteri) can help to improve vaginal health.    Do not wash the vagina with scented or douching products or use vaginal deodorants. These upset the balance of good and harmful bacteria in your " vagina.    Using a condom during sex    Limiting your number of sex partners    Using a dental dam during sex. A dental dam is a thin piece of latex that is placed over the vagina before oral sex.    Cleaning or covering sex toys with condoms each time before use      When to seek care  With treatment, symptoms can improve within 4-5 days with complete clearing in 8-10 days.  Please make an appointment to be seen in a clinic or urgent care if any of the following occur:     If you have discomfort while urinating but aren't experiencing changes in your vaginal discharge, painful urination may be a sign of a urinary tract infection (UTI) or STI    If you are having pain in the pelvic region (in the lower belly area), this is suggestive of conditions affecting the womb (uterus) itself, such as pelvic inflammatory disease or endometriosis     If your symptoms have not improved in 5 days or not resolved in 10 days     It is possible to have an allergic reaction to an antibiotic even if you have not had one in the past. If you notice a new rash, significant swelling, or difficulty breathing, stop taking this medication immediately and go to a clinic or urgent care.   Diagnosis: Bacterial vaginosis  Diagnosis ICD: N76.0    Follow up instructions: ATTENTION: If you have been prescribed medications, your prescriptions will not be sent until you choose your pharmacy.  To do so open the link within your notification, or go to [employer] and click eVisit in the menu to open your   treatment plan. From there, you can select your pharmacy at the bottom of your after visit summary. You can also go to [email or web address]  Prescriptions  Prescription: metronidazole 500 mg oral tablet, take 1 tablet by mouth every 12 hours for 7 days  Sent To: [facility]/pharmacy #[id number] - [phone] - [address]  [place] [address]

## 2025-01-15 NOTE — E-VISIT TREATED
Date: 2025 19:59:54  Clinician: Saadia Chin  Clinician NPI: [id number]  Patient: Myrtle Hurst  Patient : 1990  Patient Address: [address], [place] [address]  Patient Phone: [phone]  Visit Protocol: UTI  Patient Summary:  Myrtle is a 34 year old ( : 1990 ) female who initiated a visit for a presumed bladder infection.    The symptoms started 4-6 days ago and consist of urgency, urinary frequency, dysuria, and feeling as if the bladder is never   empty.   Symptom details   Urine color: Yellow    Denied symptoms include urinary incontinence, vaginal itching, abdominal pain, vaginal discharge, vomiting, foul-smelling urine, nausea, and chills. Myrtle denies flank pain. Myrtle does not feel   feverish.   Myrtle has used over-the-counter medications or home remedies to relieve the current symptoms.  Precipitating events  Myrtle denies having a sexually transmitted infection.  Pertinent medical history  Myrtle has had a bladder infection   before and has had 1 in the past 12 months. The most recent bladder infection was not within the last 4 weeks. The current symptoms are similar to previous bladder infection symptoms.   Myrtle is not sure what antibiotics have been effective in treating   past bladder infections.   Myrtle does not get a yeast infection when taking antibiotics.   Myrtle has not been prescribed antibiotics to prevent frequent or repeated bladder infections in the past and does not get yeast infections when taking   antibiotics. Myrtle has not experienced problems or side effects with any of the common antibiotics used to treat bladder infections.   Myrtle has not had a procedure or surgery done to the urinary tract. Myrtle has not been diagnosed with advanced   kidney disease.   Myrtle has not used a catheter and denies being a patient in a hospital or nursing home in the past 2 weeks. Myrtle does not have diabetes. Immunosuppressive conditions (e.g.,  chemotherapy, HIV, organ transplant, long-term use of   steroids or other immunosuppressive medications, splenectomy) were denied.   Myrtle does not smoke or use smokeless tobacco. Myrtle does not vape or use other e-cigarette products.   Myrtle denies pregnancy and denies breastfeeding.     MEDICATIONS: lisdexamfetamine oral, buspirone oral, albuterol sulfate inhalation, lisdexamfetamine oral, dexamethasone sodium phosphate injection, choriogonadotropin rigoberto,human recombinant subcutaneous, Ovidrel subcutaneous, albuterol sulfate   inhalation, cetirizine oral, ALLERGIES: NKDA  Clinician Response:  Dear Myrtle,  Based on the information you have provided, you have an acute urinary tract infection, also called a bladder infection. Bladder infections occur when bacteria from the outside of the body enters the urinary tract. Any   part of the urinary system can be infected, but the bladder is the most common.  Medication information  I am prescribing:       Cephalexin (Keflex) 500 mg oral capsule. Take 1 capsule by mouth every 12 hours for 7 days. There are no refills with this prescription.      Phenazopyridine (Pyridium) 200 mg oral tablet. Take 1 tablet by mouth 3 times per day for up to 2 days as needed. Take the tablets with a full glass of water after a meal. There are no refills with this prescription. This medication may also be purchased   over-the-counter.     The medication I prescribed for your bladder infection is an antibiotic. Continue taking the medication until it is gone even if you feel better. If you get an upset stomach while taking antibiotics, taking the medication with food can help.   Yeast   infections can be a common side effect of antibiotics. The most common symptom of a yeast infection is itchiness in and around the vagina. Other signs and symptoms include burning, redness of the vulva (the outer part of the female genitals), or a thick,   white vaginal discharge that looks like cottage  cheese and does not have a bad smell.  Self care  Urination helps to flush bacteria from the urinary tract. For this reason, drinking water and urinating often helps relieve some urinary symptoms and can   decrease your risk of getting bladder infections in the future.  Other steps you can take to prevent future bladder infections include:     Wipe front to back after using the bathroom    Urinate after sexual intercourse    Avoid using deodorant sprays, douches, or powders in the vaginal area     When to seek care  Please make an appointment to be seen in a clinic or urgent care if any of the following occur:     You develop new symptoms or your symptoms become worse    You have medication side effects that make it difficult to take them as prescribed    Your symptoms do not improve within 1-2 days of starting treatment    You have symptoms of a bladder infection that return shortly after completing treatment     It is possible to have an allergic reaction to an antibiotic even if you have not had one in the past. If you notice a new rash, significant swelling, or difficulty breathing, stop taking this medication immediately and go to a clinic or urgent care.   Diagnosis: Acute uncomplicated bladder infection  Diagnosis ICD: N39.0    Follow up instructions: ATTENTION: If you have been prescribed medications, your prescriptions will not be sent until you choose your pharmacy.  To do so open the link within your notification, or go to Shoulder Tap and click eVisit in the menu to open your   treatment plan. From there, you can select your pharmacy at the bottom of your after visit summary. You can also go to [email or web address]  Prescriptions  Prescription: fluconazole (Diflucan) 200 mg oral tablet, take 1 tablet by mouthon day 1 on 1 tablet by mouth on day 3  Sent To: [facility]/pharmacy #[id number] - [id number] - [address]  [place] [address]  Prescription: cephalexin (Keflex) 500 mg oral  capsule, take 1 capsule by mouth every 12 hours for 7 days  Sent To: [facility]/pharmacy #[id number] - [id number] - [address]  [place] [address]  Prescription: phenazopyridine (Pyridium) 200 mg oral tablet, take 1 tablet by mouth 3 times per day for up to 2 days as needed  Sent To: [facility]/pharmacy #[id number] - [id number] - [address]  [place] [address]

## 2025-02-26 ENCOUNTER — TELEPHONE (OUTPATIENT)
Dept: FAMILY MEDICINE CLINIC | Facility: CLINIC | Age: 35
End: 2025-02-26

## 2025-02-26 ENCOUNTER — LAB (OUTPATIENT)
Dept: LAB | Facility: HOSPITAL | Age: 35
End: 2025-02-26

## 2025-02-26 ENCOUNTER — OFFICE VISIT (OUTPATIENT)
Dept: FAMILY MEDICINE CLINIC | Facility: CLINIC | Age: 35
End: 2025-02-26

## 2025-02-26 VITALS
OXYGEN SATURATION: 99 % | DIASTOLIC BLOOD PRESSURE: 76 MMHG | RESPIRATION RATE: 20 BRPM | BODY MASS INDEX: 26.04 KG/M2 | TEMPERATURE: 98.6 F | SYSTOLIC BLOOD PRESSURE: 107 MMHG | HEART RATE: 117 BPM | WEIGHT: 175.8 LBS | HEIGHT: 69 IN

## 2025-02-26 DIAGNOSIS — J10.1 INFLUENZA A: Primary | ICD-10-CM

## 2025-02-26 DIAGNOSIS — R09.81 NASAL CONGESTION: ICD-10-CM

## 2025-02-26 DIAGNOSIS — J02.9 SORE THROAT: ICD-10-CM

## 2025-02-26 DIAGNOSIS — R05.1 ACUTE COUGH: ICD-10-CM

## 2025-02-26 LAB
B PARAPERT DNA SPEC QL NAA+PROBE: NOT DETECTED
B PERT DNA SPEC QL NAA+PROBE: NOT DETECTED
C PNEUM DNA NPH QL NAA+NON-PROBE: NOT DETECTED
FLUAV H3 RNA NPH QL NAA+PROBE: DETECTED
FLUBV RNA ISLT QL NAA+PROBE: NOT DETECTED
HADV DNA SPEC NAA+PROBE: NOT DETECTED
HCOV 229E RNA SPEC QL NAA+PROBE: NOT DETECTED
HCOV HKU1 RNA SPEC QL NAA+PROBE: NOT DETECTED
HCOV NL63 RNA SPEC QL NAA+PROBE: NOT DETECTED
HCOV OC43 RNA SPEC QL NAA+PROBE: NOT DETECTED
HMPV RNA NPH QL NAA+NON-PROBE: NOT DETECTED
HPIV1 RNA ISLT QL NAA+PROBE: NOT DETECTED
HPIV2 RNA SPEC QL NAA+PROBE: NOT DETECTED
HPIV3 RNA NPH QL NAA+PROBE: NOT DETECTED
HPIV4 P GENE NPH QL NAA+PROBE: NOT DETECTED
M PNEUMO IGG SER IA-ACNC: NOT DETECTED
RHINOVIRUS RNA SPEC NAA+PROBE: NOT DETECTED
RSV RNA NPH QL NAA+NON-PROBE: NOT DETECTED
SARS-COV-2 RNA RESP QL NAA+PROBE: NOT DETECTED

## 2025-02-26 PROCEDURE — 0202U NFCT DS 22 TRGT SARS-COV-2: CPT

## 2025-02-26 RX ORDER — BENZONATATE 100 MG/1
100 CAPSULE ORAL 3 TIMES DAILY PRN
Qty: 30 CAPSULE | Refills: 0 | Status: SHIPPED | OUTPATIENT
Start: 2025-02-26

## 2025-02-26 NOTE — PROGRESS NOTES
"Chief Complaint  Cough    Subjective    History of Present Illness      Patient presents to [facility] for   History of Present Illness  Pt presents today with c/o sore throat, fever, and cough since Monday night.       History of Present Illness      Review of Systems   All other systems reviewed and are negative.      I have reviewed and agree with the HPI and ROS information as above.  Meri Peck, APRN     Objective   Vital Signs:   /76   Pulse 117   Temp 98.6 °F (37 °C)   Resp 20   Ht 175.3 cm (69\")   Wt 79.7 kg (175 lb 12.8 oz)   SpO2 99%   BMI 25.96 kg/m²     BMI is within normal parameters. No other follow-up for BMI required.      Physical Exam  Constitutional:       Appearance: Normal appearance. She is well-developed.   HENT:      Head: Normocephalic and atraumatic.      Right Ear: External ear normal.      Left Ear: External ear normal.      Nose: Nose normal. No nasal tenderness or congestion.      Mouth/Throat:      Lips: Pink. No lesions.      Mouth: Mucous membranes are moist. No oral lesions.      Dentition: Normal dentition.      Pharynx: Oropharynx is clear. No pharyngeal swelling, oropharyngeal exudate or posterior oropharyngeal erythema.   Eyes:      General: Lids are normal. Vision grossly intact. No scleral icterus.        Right eye: No discharge.         Left eye: No discharge.      Extraocular Movements: Extraocular movements intact.      Conjunctiva/sclera: Conjunctivae normal.      Right eye: Right conjunctiva is not injected.      Left eye: Left conjunctiva is not injected.      Pupils: Pupils are equal, round, and reactive to light.   Cardiovascular:      Rate and Rhythm: Normal rate and regular rhythm.      Heart sounds: Normal heart sounds. No murmur heard.     No gallop.   Pulmonary:      Effort: Pulmonary effort is normal.      Breath sounds: Normal breath sounds and air entry. No wheezing, rhonchi or rales.   Musculoskeletal:         " General: No tenderness or deformity. Normal range of motion.      Cervical back: Full passive range of motion without pain, normal range of motion and neck supple.      Right lower leg: No edema.      Left lower leg: No edema.   Skin:     General: Skin is warm and dry.      Coloration: Skin is not jaundiced.      Findings: No rash.   Neurological:      Mental Status: She is alert and oriented to person, place, and time.      Sensory: Sensation is intact.      Motor: Motor function is intact.      Coordination: Coordination is intact.      Gait: Gait is intact.   Psychiatric:         Attention and Perception: Attention normal.         Mood and Affect: Mood and affect normal.         Behavior: Behavior is not hyperactive. Behavior is cooperative.         Thought Content: Thought content normal.         Judgment: Judgment normal.          CHRISTOFER-7:      PHQ-2 Depression Screening    Little interest or pleasure in doing things? Not at all   Feeling down, depressed, or hopeless? Not at all   PHQ-2 Total Score 0      PHQ-9 Depression Screening  Little interest or pleasure in doing things? Not at all   Feeling down, depressed, or hopeless? Not at all   PHQ-2 Total Score 0   Trouble falling or staying asleep, or sleeping too much?     Feeling tired or having little energy?     Poor appetite or overeating?     Feeling bad about yourself - or that you are a failure or have let yourself or your family down?     Trouble concentrating on things, such as reading the newspaper or watching television?     Moving or speaking so slowly that other people could have noticed? Or the opposite - being so fidgety or restless that you have been moving around a lot more than usual?     Thoughts that you would be better off dead, or of hurting yourself in some way?     PHQ-9 Total Score     If you checked off any problems, how difficult have these problems made it for you to do your work, take care of things at home, or get along with other  people? Not difficult at all           Result Review  Data Reviewed:                   Assessment and Plan      Diagnoses and all orders for this visit:    1. Influenza A (Primary)    2. Acute cough  -     Respiratory Panel PCR w/COVID-19(SARS-CoV-2) [facility] In-House, NP Swab in UTM/VTM, 2 HR TAT - Swab, Nasopharynx; Future  -     benzonatate (Tessalon Perles) 100 MG capsule; Take 1 capsule by mouth 3 (Three) Times a Day As Needed for Cough.  Dispense: 30 capsule; Refill: 0    3. Sore throat  -     Respiratory Panel PCR w/COVID-19(SARS-CoV-2) [facility] In-House, NP Swab in UTM/VTM, 2 HR TAT - Swab, Nasopharynx; Future    4. Nasal congestion  -     Respiratory Panel PCR w/COVID-19(SARS-CoV-2) [facility] In-House, NP Swab in UTM/VTM, 2 HR TAT - Swab, Nasopharynx; Future      Patient is here today with complaints of cough, sore throat and nasal congestion.  Patient states she began having symptoms on Monday.  She states she felt like she has had a fever but she has not checked this.  Patient does have body aches.  Overall on exam today the patient's exam is unremarkable.  I discussed with her I will send her in something for cough we will do a respiratory panel.  Patient has taken DayQuil and NyQuil and I discussed with her that she can continue this at this time.  Patient was understanding.  If respiratory panel is negative I will send in an antibiotic.  Assessment & Plan          Follow Up   Return if symptoms worsen or fail to improve.  Patient was given instructions and counseling regarding her condition or for health maintenance advice. Please see specific information pulled into the AVS if appropriate.     Patient or patient representative verbalized consent for the use of Ambient Listening during the visit with  KEIKO Kaba for chart documentation. 2/26/2025  13:43 CST

## 2025-02-26 NOTE — TELEPHONE ENCOUNTER
----- Message from Meri Peck sent at 2/26/2025  1:13 PM CST -----  Please let patient know resp panel shows influenza a. Patient will need to let this run  its course and may use otc meds such as  ibuprofen/tylenol for headache and fever relief

## 2025-03-18 ENCOUNTER — OFFICE VISIT (OUTPATIENT)
Dept: FAMILY MEDICINE CLINIC | Facility: CLINIC | Age: 35
End: 2025-03-18
Payer: COMMERCIAL

## 2025-03-18 VITALS
DIASTOLIC BLOOD PRESSURE: 71 MMHG | HEART RATE: 98 BPM | BODY MASS INDEX: 25.92 KG/M2 | HEIGHT: 69 IN | WEIGHT: 175 LBS | SYSTOLIC BLOOD PRESSURE: 103 MMHG | TEMPERATURE: 98.4 F | RESPIRATION RATE: 20 BRPM | OXYGEN SATURATION: 98 %

## 2025-03-18 DIAGNOSIS — F90.0 ATTENTION DEFICIT HYPERACTIVITY DISORDER (ADHD), PREDOMINANTLY INATTENTIVE TYPE: Primary | ICD-10-CM

## 2025-03-18 DIAGNOSIS — R42 DIZZINESS: ICD-10-CM

## 2025-03-18 DIAGNOSIS — H65.93 FLUID LEVEL BEHIND TYMPANIC MEMBRANE OF BOTH EARS: ICD-10-CM

## 2025-03-18 DIAGNOSIS — F90.2 ATTENTION DEFICIT HYPERACTIVITY DISORDER (ADHD), COMBINED TYPE: Primary | ICD-10-CM

## 2025-03-18 PROCEDURE — 99214 OFFICE O/P EST MOD 30 MIN: CPT | Performed by: NURSE PRACTITIONER

## 2025-03-18 RX ORDER — LISDEXAMFETAMINE DIMESYLATE 50 MG/1
50 CAPSULE ORAL EVERY MORNING
Qty: 30 CAPSULE | Refills: 0 | Status: SHIPPED | OUTPATIENT
Start: 2025-04-15 | End: 2025-05-15

## 2025-03-18 RX ORDER — FLUTICASONE PROPIONATE 50 MCG
2 SPRAY, SUSPENSION (ML) NASAL DAILY
Qty: 16 G | Refills: 2 | Status: SHIPPED | OUTPATIENT
Start: 2025-03-18

## 2025-03-18 RX ORDER — MECLIZINE HYDROCHLORIDE 25 MG/1
25 TABLET ORAL 3 TIMES DAILY PRN
Qty: 90 TABLET | Refills: 0 | Status: SHIPPED | OUTPATIENT
Start: 2025-03-18

## 2025-03-18 RX ORDER — LISDEXAMFETAMINE DIMESYLATE 50 MG/1
50 CAPSULE ORAL EVERY MORNING
Qty: 30 CAPSULE | Refills: 0 | Status: SHIPPED | OUTPATIENT
Start: 2025-03-18 | End: 2025-04-17

## 2025-03-18 NOTE — PROGRESS NOTES
"Chief Complaint  possible vertigo, sinus pressure, and ADHD    Subjective    History of Present Illness      Patient presents to [facility] for   History of Present Illness  Pt here today c/o dizziness when she moves her head and sinus pressure.  Pt states she's had this for about 3 days and it's worse in the morning.  Also needs 3 month    ADHD/Mood HPI    Visit for:  follow-up. Most recent visit was 3 months ago.  Interim changes to follow up on today: no change in medication  Work/School Performance:  going well  Cognitive:  able to focus    Behavior  Hyperactivity: is not hyperactive  Impulsivity: no impulsivity and no unsafe behavior  Tasking: able to initiate tasks, able to complete tasks, and able to mult-task    Social  ADHD social/impulsive symptoms:  not impatient, does not blurt out inappropriate comments, and no excessive talking    Behavioral health  Behavior: no concerns  Emotional coping: demonstrates feelings of no concerns           History of Present Illness      Review of Systems   Constitutional: Negative.    HENT: Negative.     Eyes: Negative.    Respiratory: Negative.     Cardiovascular: Negative.    Gastrointestinal: Negative.    Endocrine: Negative.    Genitourinary: Negative.    Musculoskeletal: Negative.    Skin: Negative.    Allergic/Immunologic: Negative.    Neurological:  Positive for dizziness.   Hematological: Negative.    Psychiatric/Behavioral: Negative.         I have reviewed and agree with the HPI and ROS information as above.  Mellisa Grove, APRN     Objective   Vital Signs:   /71 (BP Location: Left arm, Patient Position: Sitting, Cuff Size: Adult)   Pulse 98   Temp 98.4 °F (36.9 °C) (Temporal)   Resp 20   Ht 175.3 cm (69\")   Wt 79.4 kg (175 lb)   SpO2 98%   BMI 25.84 kg/m²     BMI is >= 25 and <30. (Overweight) The following options were offered after discussion;: weight loss educational material (shared in after visit " summary)      Physical Exam  Constitutional:       Appearance: Normal appearance. She is well-developed.   HENT:      Head: Normocephalic and atraumatic.      Right Ear: External ear normal. A middle ear effusion is present.      Left Ear: External ear normal. A middle ear effusion is present.      Nose: Nose normal. No nasal tenderness or congestion.      Mouth/Throat:      Lips: Pink. No lesions.      Mouth: Mucous membranes are moist. No oral lesions.      Dentition: Normal dentition.      Pharynx: Oropharynx is clear. No pharyngeal swelling, oropharyngeal exudate or posterior oropharyngeal erythema.   Eyes:      General: Lids are normal. Vision grossly intact. No scleral icterus.        Right eye: No discharge.         Left eye: No discharge.      Extraocular Movements: Extraocular movements intact.      Conjunctiva/sclera: Conjunctivae normal.      Right eye: Right conjunctiva is not injected.      Left eye: Left conjunctiva is not injected.      Pupils: Pupils are equal, round, and reactive to light.   Cardiovascular:      Rate and Rhythm: Normal rate and regular rhythm.      Heart sounds: Normal heart sounds. No murmur heard.     No gallop.   Pulmonary:      Effort: Pulmonary effort is normal.      Breath sounds: Normal breath sounds and air entry. No wheezing, rhonchi or rales.   Musculoskeletal:         General: No tenderness or deformity. Normal range of motion.      Cervical back: Full passive range of motion without pain, normal range of motion and neck supple.      Right lower leg: No edema.      Left lower leg: No edema.   Skin:     General: Skin is warm and dry.      Coloration: Skin is not jaundiced.      Findings: No rash.   Neurological:      Mental Status: She is alert and oriented to person, place, and time.      Sensory: Sensation is intact.      Motor: Motor function is intact.      Coordination: Coordination is intact.      Gait: Gait is intact.   Psychiatric:         Attention and Perception:  Attention normal.         Mood and Affect: Mood and affect normal.         Behavior: Behavior is not hyperactive. Behavior is cooperative.         Thought Content: Thought content normal.         Judgment: Judgment normal.          CHRISTOFER-7:      PHQ-2 Depression Screening    Little interest or pleasure in doing things?     Feeling down, depressed, or hopeless?     PHQ-2 Total Score        PHQ-9 Depression Screening  Little interest or pleasure in doing things?     Feeling down, depressed, or hopeless?     PHQ-2 Total Score     Trouble falling or staying asleep, or sleeping too much?     Feeling tired or having little energy?     Poor appetite or overeating?     Feeling bad about yourself - or that you are a failure or have let yourself or your family down?     Trouble concentrating on things, such as reading the newspaper or watching television?     Moving or speaking so slowly that other people could have noticed? Or the opposite - being so fidgety or restless that you have been moving around a lot more than usual?     Thoughts that you would be better off dead, or of hurting yourself in some way?     PHQ-9 Total Score     If you checked off any problems, how difficult have these problems made it for you to do your work, take care of things at home, or get along with other people?             Result Review  Data Reviewed:   The following data was reviewed by: KEIKO Hebert on 03/18/2025:  Urine Drug Screen - Urine, Clean Catch (09/21/2023 10:03)            Assessment and Plan      Diagnoses and all orders for this visit:    1. Attention deficit hyperactivity disorder (ADHD), predominantly inattentive type (Primary)    2. Dizziness  -     CT Head Without Contrast; Future  -     fluticasone (FLONASE) 50 MCG/ACT nasal spray; Administer 2 sprays into the nostril(s) as directed by provider Daily.  Dispense: 16 g; Refill: 2  -     meclizine (ANTIVERT) 25 MG tablet; Take 1 tablet by mouth 3 (Three) Times a Day As  Needed for Dizziness.  Dispense: 90 tablet; Refill: 0    3. Fluid level behind tympanic membrane of both ears  -     fluticasone (FLONASE) 50 MCG/ACT nasal spray; Administer 2 sprays into the nostril(s) as directed by provider Daily.  Dispense: 16 g; Refill: 2        Assessment & Plan      Patient here today for 3-month ADHD follow-up as well as complaints of dizziness.  She states she is doing well with her current dose of Vyvanse.  She would like to continue the same.  She complains of dizziness that began on Sunday.  She is also felt fatigued, but attributed it to getting back to work after having the flu.  She states this has continued through yesterday and today.  She states the dizziness will occur anytime she moves her head or moves too quickly.  She states it feels like the room is spinning when she has the dizziness.  She also reports having some sinus pressure.  On exam there is fluid noted behind bilateral TMs.  Otherwise, normal physical exam.    Plan:    1.  Continue Vyvanse 50 mg.  Nnamdi pending.  CSA updated today.  UDS up-to-date and appropriate.  2.  CT head ordered.  3.  Will start Flonase nasal spray.  4.  Will also send meclizine.  5.  Will make further recommendations pending CT results.  6.  Follow-up 3 months.      ADHD Follow up:    PDMP reviewed and pending. ADRS (Adult ADHD Assessment Form) reviewed in detail, scanned in chart, and has been reviewed at time of appointment.  All questions, including medication and side effects, were discussed in detail at time of patient's visit. Patient will continue same medication which was discussed at today's visit. Patient is to return in 3 month(s).    Last Urine Toxicity  More data may exist         Latest Ref Rng & Units 9/21/2023 9/27/2022   LAST URINE TOXICITY RESULTS   Amphetamine, Urine Qual Negative Positive  Negative    Barbiturates Screen, Urine Negative Negative  Negative    Benzodiazepine Screen, Urine Negative Negative  Negative     Buprenorphine, Screen, Urine Negative Negative  Negative    Cocaine Screen, Urine Negative Negative  Negative    Fentanyl, Urine Negative Negative  -   Methadone Screen , Urine Negative Negative  Negative    Methamphetamine, Ur Negative Negative  Negative         Urine Drug Screen Results: UDS RESULTS: Current results appropriate    CSA completed on: 3/18/25    Follow Up   Return in about 3 months (around 6/18/2025) for Recheck.  Patient was given instructions and counseling regarding her condition or for health maintenance advice. Please see specific information pulled into the AVS if appropriate.

## 2025-03-31 ENCOUNTER — HOSPITAL ENCOUNTER (OUTPATIENT)
Dept: CT IMAGING | Facility: HOSPITAL | Age: 35
Discharge: HOME | End: 2025-03-31
Admitting: NURSE PRACTITIONER
Payer: COMMERCIAL

## 2025-03-31 DIAGNOSIS — R42 DIZZINESS: ICD-10-CM

## 2025-03-31 PROCEDURE — 70450 CT HEAD/BRAIN W/O DYE: CPT

## 2025-04-02 ENCOUNTER — RESULTS FOLLOW-UP (OUTPATIENT)
Dept: FAMILY MEDICINE CLINIC | Facility: CLINIC | Age: 35
End: 2025-04-02
Payer: COMMERCIAL

## 2025-04-02 NOTE — TELEPHONE ENCOUNTER
Spoke with pt via telephone regarding imaging results.  Pt informed that the CT of her head was normal. Pt KYLIE.

## 2025-04-17 DIAGNOSIS — F90.2 ATTENTION DEFICIT HYPERACTIVITY DISORDER (ADHD), COMBINED TYPE: ICD-10-CM

## 2025-04-17 RX ORDER — LISDEXAMFETAMINE DIMESYLATE 50 MG/1
50 CAPSULE ORAL EVERY MORNING
Qty: 30 CAPSULE | Refills: 0 | OUTPATIENT
Start: 2025-04-17 | End: 2025-05-17

## 2025-04-17 NOTE — TELEPHONE ENCOUNTER
Caller: Myrtle Hurst    Relationship: Self    Best call back number: [phone]     Requested Prescriptions:   Requested Prescriptions     Pending Prescriptions Disp Refills    lisdexamfetamine (Vyvanse) 50 MG capsule 30 capsule 0     Sig: Take 1 capsule by mouth Every Morning for 30 days        Pharmacy where request should be sent: [id number] [place] [place] [facility] [address] AT ACROSS FROM [facility] [phone] [facility] [phone]      Last office visit with prescribing clinician: 7/19/2024   Last telemedicine visit with prescribing clinician: Visit date not found   Next office visit with prescribing clinician: 6/16/2025     Additional details provided by patient: TWO DAYS LEFT     Does the patient have less than a 3 day supply:  [x] Yes  [] No    Would you like a call back once the refill request has been completed: [] Yes [] No    If the office needs to give you a call back, can they leave a voicemail: [] Yes [] No    Kimberly Parsons Rep   04/17/25 11:38 CDT

## 2025-04-18 ENCOUNTER — OFFICE VISIT (OUTPATIENT)
Dept: FAMILY MEDICINE CLINIC | Facility: CLINIC | Age: 35
End: 2025-04-18
Payer: COMMERCIAL

## 2025-04-18 VITALS
RESPIRATION RATE: 20 BRPM | HEART RATE: 87 BPM | HEIGHT: 69 IN | DIASTOLIC BLOOD PRESSURE: 82 MMHG | BODY MASS INDEX: 26.22 KG/M2 | WEIGHT: 177 LBS | SYSTOLIC BLOOD PRESSURE: 117 MMHG

## 2025-04-18 DIAGNOSIS — F41.9 ANXIETY: ICD-10-CM

## 2025-04-18 DIAGNOSIS — Z00.00 WELLNESS EXAMINATION: Primary | ICD-10-CM

## 2025-04-18 DIAGNOSIS — E66.3 OVERWEIGHT WITH BODY MASS INDEX (BMI) OF 26 TO 26.9 IN ADULT: ICD-10-CM

## 2025-04-18 DIAGNOSIS — Z12.4 SCREENING FOR CERVICAL CANCER: ICD-10-CM

## 2025-04-18 DIAGNOSIS — F90.0 ATTENTION DEFICIT HYPERACTIVITY DISORDER (ADHD), PREDOMINANTLY INATTENTIVE TYPE: ICD-10-CM

## 2025-04-18 DIAGNOSIS — Z12.31 SCREENING MAMMOGRAM FOR BREAST CANCER: ICD-10-CM

## 2025-04-18 DIAGNOSIS — J45.20 MILD INTERMITTENT ASTHMA, UNSPECIFIED WHETHER COMPLICATED: ICD-10-CM

## 2025-04-18 DIAGNOSIS — Z80.3 FAMILY HISTORY OF BREAST CANCER: ICD-10-CM

## 2025-04-18 RX ORDER — ALBUTEROL SULFATE 90 UG/1
2 INHALANT RESPIRATORY (INHALATION) EVERY 4 HOURS PRN
Qty: 18 G | Refills: 2 | Status: SHIPPED | OUTPATIENT
Start: 2025-04-18

## 2025-04-18 NOTE — PROGRESS NOTES
"Chief Complaint  Annual Exam    Subjective    History of Present Illness      Patient presents to [facility] for        History of Present Illness  The patient is a 34-year-old female who presents for a wellness visit.    She has expressed interest in undergoing blood work, including  but prefers to schedule these at a later date. She acknowledges that she is overdue for her mammogram and Pap smear. She has a family history of breast cancer and had a lump removed when she was 18 years old. She has not undergone any genetic screenings, although both of her sisters have. Her mother had colon cancer a few years ago. She has a history of smoking during her college years, but it was a brief period.  She has lost approximately 70 pounds through diet, exercise, and mental health management. She has a history of asthma. She is currently on Vyvanse and plans to  her prescription today. She reports no major medical or psychiatric illnesses, including diabetes or heart disease. She has undergone a breast biopsy and tonsillectomy in the past.    PAST SURGICAL HISTORY:  Breast biopsy  Tonsillectomy    SOCIAL HISTORY  She admits to a brief period of smoking in college but has not continued the habit.    FAMILY HISTORY  She has an extensive history of breast cancer in her family, including her grandmother who had a double mastectomy. Her mother had colon cancer a few years ago. Her older sister is in remission from ovarian cancer.    Review of Systems    I have reviewed and agree with the HPI and ROS information as above.  Iris Ibarra, APRN     Objective   Vital Signs:   /82   Pulse 87   Resp 20   Ht 175.3 cm (69\")   Wt 80.3 kg (177 lb)   BMI 26.14 kg/m²            Physical Exam  Constitutional:       Appearance: Normal appearance. She is well-developed.   HENT:      Head: Normocephalic and atraumatic.      Right Ear: Tympanic membrane, ear canal and external ear normal.      " Left Ear: Tympanic membrane, ear canal and external ear normal.      Nose: Nose normal. No septal deviation, nasal tenderness or congestion.      Mouth/Throat:      Lips: Pink. No lesions.      Mouth: Mucous membranes are moist. No oral lesions.      Dentition: Normal dentition.      Pharynx: Oropharynx is clear. No pharyngeal swelling, oropharyngeal exudate or posterior oropharyngeal erythema.   Eyes:      General: Lids are normal. Vision grossly intact. No scleral icterus.        Right eye: No discharge.         Left eye: No discharge.      Extraocular Movements: Extraocular movements intact.      Conjunctiva/sclera: Conjunctivae normal.      Right eye: Right conjunctiva is not injected.      Left eye: Left conjunctiva is not injected.      Pupils: Pupils are equal, round, and reactive to light.   Neck:      Thyroid: No thyroid mass.      Trachea: Trachea normal.   Cardiovascular:      Rate and Rhythm: Normal rate and regular rhythm.      Heart sounds: Normal heart sounds. No murmur heard.     No gallop.   Pulmonary:      Effort: Pulmonary effort is normal.      Breath sounds: Normal breath sounds and air entry. No wheezing, rhonchi or rales.   Abdominal:      General: There is no distension.      Palpations: Abdomen is soft. There is no mass.      Tenderness: There is no abdominal tenderness. There is no right CVA tenderness, left CVA tenderness, guarding or rebound.   Musculoskeletal:         General: No tenderness or deformity. Normal range of motion.      Cervical back: Full passive range of motion without pain, normal range of motion and neck supple.      Thoracic back: Normal.      Right lower leg: No edema.      Left lower leg: No edema.   Skin:     General: Skin is warm and dry.      Coloration: Skin is not jaundiced.      Findings: No rash.   Neurological:      Mental Status: She is alert and oriented to person, place, and time.      Sensory: Sensation is intact.      Motor: Motor function is intact.       Coordination: Coordination is intact.      Gait: Gait is intact.      Deep Tendon Reflexes: Reflexes are normal and symmetric.   Psychiatric:         Mood and Affect: Mood and affect normal.         Judgment: Judgment normal.            PHQ-2 Depression Screening    Little interest or pleasure in doing things? Not at all   Feeling down, depressed, or hopeless? Not at all   PHQ-2 Total Score 0      PHQ-9 Depression Screening  Little interest or pleasure in doing things? Not at all   Feeling down, depressed, or hopeless? Not at all   PHQ-2 Total Score 0   Trouble falling or staying asleep, or sleeping too much?     Feeling tired or having little energy?     Poor appetite or overeating?     Feeling bad about yourself - or that you are a failure or have let yourself or your family down?     Trouble concentrating on things, such as reading the newspaper or watching television?     Moving or speaking so slowly that other people could have noticed? Or the opposite - being so fidgety or restless that you have been moving around a lot more than usual?     Thoughts that you would be better off dead, or of hurting yourself in some way?     PHQ-9 Total Score     If you checked off any problems, how difficult have these problems made it for you to do your work, take care of things at home, or get along with other people? Not difficult at all           Result Review  Data Reviewed:                   Assessment and Plan      Diagnoses and all orders for this visit:    1. Wellness examination (Primary)  -     CBC Auto Differential; Future  -     Comprehensive Metabolic Panel; Future  -     Lipid Panel; Future  -     Urinalysis With Culture If Indicated - Urine, Clean Catch; Future  -     TSH; Future  -     Hemoglobin A1c; Future    2. Screening mammogram for breast cancer  -     Mammo Screening Digital Tomosynthesis Bilateral With CAD; Future    3. Family history of breast cancer  -     Mammo Screening Digital Tomosynthesis  Bilateral With CAD; Future  -     Ambulatory Referral to Genetic Counseling/Testing    4. Screening for cervical cancer  -     Ambulatory Referral to Gynecology    5. Attention deficit hyperactivity disorder (ADHD), predominantly inattentive type    6. Anxiety    7. Overweight with body mass index (BMI) of 26 to 26.9 in adult    8. Mild intermittent asthma, unspecified whether complicated  -     albuterol sulfate  (90 Base) MCG/ACT inhaler; Inhale 2 puffs Every 4 (Four) Hours As Needed for Wheezing.  Dispense: 18 g; Refill: 2        Assessment & Plan  1. Health maintenance.  - Blood pressure readings are within the normal range today. Significant weight loss has been maintained.  - A comprehensive set of blood work will be ordered, including cholesterol, sugar, thyroid, kidney, and liver enzyme tests.  - A mammogram will be scheduled, and a referral for genetic screenings due to extensive family history of breast cancer will be made. A consultation with a gynecologist will be arranged for cervical cancer screening.  - Encouraged to continue the current medication regimen.    2. Asthma.  - No recent exacerbations reported.  - Physical examination reveals no wheezing or respiratory distress.  - - Refill for asthma inhaler will be provided.    3. Attention Deficit Disorder (ADD).  - Currently managed with Vyvanse.    4. Family history of cancer.  - Extensive family history of breast cancer noted, including a grandmother with a double mastectomy and a sister in remission from ovarian cancer.  - No genetic screenings have been done previously.  - A mammogram and genetic screening will be initiated, with a survey to be completed regarding family history.  - Referral to a gynecologist for cervical cancer screening and further management.    Physical form filled out and given in office today.       Follow Up   Return for Next scheduled follow up.  Patient was given instructions and counseling regarding her condition  or for health maintenance advice. Please see specific information pulled into the AVS if appropriate.     Patient or patient representative verbalized consent for the use of Ambient Listening during the visit with  KEIKO Fine for chart documentation. 4/18/2025  08:59 CDT

## 2025-05-01 NOTE — PROGRESS NOTES
Myrtle Hurst, a 34 y.o. female, was referred for genetic counseling due to a family history of cancer. Genetic counseling was performed via telehealth. Ms. Hurst was in-person at [facility] at the time of the appointment. Ms. Hurst has no personal history of cancer. she was 11 at menarche and had her first child at age 18. she is pre-menopausal and retains her uterus and ovaries. Ms. Hurst has not yet started annual screening mammograms. she had a lumpectomy at age 23 and reports it was benign. she has not yet started colonoscopies. she was interested in discussing her risk for a hereditary cancer syndrome. Ms. Hurst decided to pursue comprehensive genetic testing to evaluate her risk of cancer, therefore the CancerNext-Expanded Panel was ordered through [facility] which analyzes 77 genes associated with an increased cancer risk. her blood was drawn on 5/6/2025. Results are expected 2-3 weeks after the lab receives her sample.     PERTINENT FAMILY HISTORY: (See attached pedigree)   Father:    Kidney cancer  Mother:   Melanoma, 47      Colon cancer, 68  Mat. Half-Sister:  Ovarian cancer, 40      Reported positive GT  Mat. Grandmother:  Breast cancer, 78  Mat. Great-Aunts (x2): Breast cancer    We do not have medical records regarding any of these diagnoses. A copy of Ms. Hurst's maternal half-sister's genetic testing results [was/ was not] available for review. We discussed how having a copy of these results for review could be helpful.     RISK ASSESSMENT:  Ms. Hurst's family history of cancer raises the question of a hereditary cancer syndrome. NCCN guidelines for genetic testing for high penetrance breast cancer genes states that individuals with a close relative diagnosed with ovarian cancer at any age may consider genetic testing. Based on Ms. Hurst's maternal half-sister diagnosis of ovarian cancer, she would clearly meet this criteria. In addition, she meets criteria based on the maternal family  history of breast cancer. This risk assessment is based on the family history information provided at the time of the appointment.  The assessment could change in the future should new information be obtained.    GENETIC COUNSELING:  We reviewed the family history information in detail. Cases of cancer follow three general patterns: sporadic, familial, and hereditary.  While most cancer is sporadic, some cases appear to occur in family clusters.  These cases are said to be familial and account for 10-20% of cancer cases.  Familial cases may be due to a combination of shared genes and environmental factors among family members.  In even fewer cases, the risk for cancer is inherited, and the genes responsible for the increased cancer risk are known.       Family histories typical of hereditary cancer syndromes usually include multiple first- and second-degree relatives diagnosed with cancer types that define a syndrome.  These cases tend to be diagnosed at younger-than-expected ages and can be bilateral or multifocal.  The cancer in these families follows an autosomal dominant inheritance pattern, which indicates the likely presence of a mutation in a cancer susceptibility gene.  Children and siblings of an individual believed to carry this mutation have a 50% chance of inheriting that mutation, thereby inheriting the increased risk to develop cancer.  These mutations can be passed down from the maternal or the paternal lineage.     Due to Ms. Hurst's family history of breast cancer, we discussed hereditary breast cancer. Hereditary breast cancer accounts for 5-10% of all cases of breast cancer.  A significant proportion of hereditary breast cancer can be attributed to mutations in the BRCA1 and BRCA2 genes.  Mutations in these genes confer an increased risk for breast cancer, ovarian cancer, male breast cancer, prostate cancer, and pancreatic cancer.  Women with a BRCA1 or BRCA2 mutation have over a 60% lifetime  risk of breast cancer and up to a 58% risk of ovarian cancer.  There are other clinically significant breast cancer related genes in addition to BRCA1/2.      There are other genes that are known to be associated with an increased risk for cancer.  Some of these genes have well defined cancer risks and established management guidelines.  Other genes that can be tested for have been more recently described, and there may be less data regarding the risks and therefore may not have established management guidelines. We discussed these limitations at length. Based on Ms. Hurst's desire to get as much information as possible regarding her personal risks and potential risks for her family, she opted to pursue testing through a panel that would look at several other genes known to increase the risk for cancer.     GENETIC TESTING:  The risks, benefits, and limitations of genetic testing and implications for clinical management following testing were reviewed. DNA test results can influence decisions regarding screening and prevention. Genetic testing can have significant psychological implications for both individuals and families. Also discussed was the possibility of employment and insurance discrimination based on genetic test results and the laws in place to prevent this, as well as the limitations of these laws.       We discussed panel testing, which would involve testing 77 genes associated with increased cancer risk. The implications of a positive or negative test result were discussed.  We also discussed the importance of testing an affected relative and how a negative result for Ms. Hurst wouldn't necessarily mean the cancers presenting in her family weren't due to a genetic mutation that Ms. Hurst did not inherit.  In general, a negative genetic test result is most informative if a mutation has first been established in an affected member of the family.  In cases where an affected individual is not available  or interested in testing, it is appropriate to offer testing to an unaffected individual.     We discussed the possibility that, in some cases, genetic test results may be ambiguous due to the identification of a genetic variant of uncertain significance (VUS). These variants may or may not be associated with an increased cancer risk. With multigene panel testing, it is not uncommon for a VUS to be identified.  If a VUS is identified, testing family members is typically not recommended and screening recommendations are made based on the family history.  The laboratories that perform genetic testing work to reclassify the VUS and send out an amended report if and when a VUS is reclassified.  The majority of variant findings are ultimately reclassified to a negative result. Given Ms. Hurst's family history, a negative test result does not eliminate all cancer risk, although the risk may not be as high as it would with positive genetic testing.     PLAN: Genetic testing was ordered via the CancerNext-Expanded Panel through [facility]. her blood was drawn 5/6/2025 for testing. Results are expected 2-3 weeks after the lab receives her sample. If she has any questions in the meantime, she is welcome to call me at [phone].      Sofie Castro, MS, [facility] [place] [facility]  Licensed Certified Genetic Counselor    Total time spent caring for the patient today was 60 minutes.  This time includes chart review, time spent during the visit, and time spent after the visit on documentation and follow-up.

## 2025-05-06 ENCOUNTER — CLINICAL SUPPORT (OUTPATIENT)
Dept: GENETICS | Facility: HOSPITAL | Age: 35
End: 2025-05-06
Payer: COMMERCIAL

## 2025-05-06 DIAGNOSIS — Z80.51 FAMILY HISTORY OF KIDNEY CANCER: ICD-10-CM

## 2025-05-06 DIAGNOSIS — Z80.3 FAMILY HISTORY OF BREAST CANCER: ICD-10-CM

## 2025-05-06 DIAGNOSIS — Z13.79 GENETIC TESTING: Primary | ICD-10-CM

## 2025-05-06 DIAGNOSIS — Z80.8 FAMILY HISTORY OF MALIGNANT MELANOMA: ICD-10-CM

## 2025-05-06 DIAGNOSIS — Z80.0 FAMILY HISTORY OF COLON CANCER: ICD-10-CM

## 2025-05-06 DIAGNOSIS — Z80.41 FAMILY HISTORY OF OVARIAN CANCER: ICD-10-CM

## 2025-05-19 ENCOUNTER — DOCUMENTATION (OUTPATIENT)
Dept: GENETICS | Facility: HOSPITAL | Age: 35
End: 2025-05-19
Payer: COMMERCIAL

## 2025-05-19 NOTE — PROGRESS NOTES
Myrtle Hurst, a 34 y.o. female, was referred for genetic counseling due to a family history of cancer. Genetic counseling was performed via telehealth. Ms. Hurst was in-person at [facility] at the time of the appointment. Ms. Hurst has no personal history of cancer. she was 11 at menarche and had her first child at age 18. she is pre-menopausal and retains her uterus and ovaries. Ms. Hurst has not yet started annual screening mammograms. she had a lumpectomy at age 23 and reports it was benign. she has not yet started colonoscopies. she was interested in discussing her risk for a hereditary cancer syndrome. Ms. Hurst decided to pursue comprehensive genetic testing to evaluate her risk of cancer, therefore the CancerNext-Expanded Panel was ordered through [employer] which analyzes 77 genes associated with an increased cancer risk. Genetic testing was negative for known pathogenic mutations in the 77 genes on this panel. 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 Variants are changes in DNA that may or may not affect the function of the gene. The classification of a variant to either a benign gene change or pathogenic mutation depends on a number of factors. The majority (estimated to be >90%) of VUS's are eventually reclassified as benign gene changes. It is not recommended that any unaffected relatives be tested for this variant at this time, and management should not be altered based on this variant.  Management should be guided by family history assessments. These results were discussed with Ms. Hurst by telephone on 5/19/2025.    PERTINENT FAMILY HISTORY: (See attached pedigree)   Father:                                     Kidney cancer  Mother:                                    Melanoma, 47                                                  Colon cancer, 68  Mat. Half-Sister:                      Ovarian cancer, 40                                                   Reported positive GT  Mat. Grandmother:                  Breast cancer, 78  Mat. Great-Aunts (x2): Breast cancer     We do not have medical records regarding any of these diagnoses. A copy of Ms. Hurst's maternal half-sister's genetic testing results was not available for review. We discussed how having a copy of these results for review could be helpful.      RISK ASSESSMENT:  Ms. Hurst's family history of cancer raises the question of a hereditary cancer syndrome. NCCN guidelines for genetic testing for high penetrance breast cancer genes states that individuals with a close relative diagnosed with ovarian cancer at any age may consider genetic testing. Based on Ms. Hurst's maternal half-sister diagnosis of ovarian cancer, she would clearly meet this criteria. In addition, she meets criteria based on the maternal family history of breast cancer. This risk assessment is based on the family history information provided at the time of the appointment.  The assessment could change in the future should new information be obtained.     GENETIC COUNSELING:  We reviewed the family history information in detail. Cases of cancer follow three general patterns: sporadic, familial, and hereditary.  While most cancer is sporadic, some cases appear to occur in family clusters.  These cases are said to be familial and account for 10-20% of cancer cases.  Familial cases may be due to a combination of shared genes and environmental factors among family members.  In even fewer cases, the risk for cancer is inherited, and the genes responsible for the increased cancer risk are known.       Family histories typical of hereditary cancer syndromes usually include multiple first- and second-degree relatives diagnosed with cancer types that define a syndrome.  These cases tend to be diagnosed at younger-than-expected ages and can be bilateral or multifocal.  The cancer in these families follows  an autosomal dominant inheritance pattern, which indicates the likely presence of a mutation in a cancer susceptibility gene.  Children and siblings of an individual believed to carry this mutation have a 50% chance of inheriting that mutation, thereby inheriting the increased risk to develop cancer.  These mutations can be passed down from the maternal or the paternal lineage.     Due to Ms. Hurst's family history of breast cancer, we discussed hereditary breast cancer. Hereditary breast cancer accounts for 5-10% of all cases of breast cancer.  A significant proportion of hereditary breast cancer can be attributed to mutations in the BRCA1 and BRCA2 genes.  Mutations in these genes confer an increased risk for breast cancer, ovarian cancer, male breast cancer, prostate cancer, and pancreatic cancer.  Women with a BRCA1 or BRCA2 mutation have over a 60% lifetime risk of breast cancer and up to a 58% risk of ovarian cancer.  There are other clinically significant breast cancer related genes in addition to BRCA1/2.       There are other genes that are known to be associated with an increased risk for cancer.  Some of these genes have well defined cancer risks and established management guidelines.  Other genes that can be tested for have been more recently described, and there may be less data regarding the risks and therefore may not have established management guidelines. We discussed these limitations at length. Based on Ms. Hurst's desire to get as much information as possible regarding her personal risks and potential risks for her family, she opted to pursue testing through a panel that would look at several other genes known to increase the risk for cancer.     GENETIC TESTING:  The risks, benefits, and limitations of genetic testing and implications for clinical management following testing were reviewed. DNA test results can influence decisions regarding screening and prevention. Genetic testing can have  significant psychological implications for both individuals and families. Also discussed was the possibility of employment and insurance discrimination based on genetic test results and the laws in place to prevent this, as well as the limitations of these laws.       We discussed panel testing, which would involve testing 77 genes associated with increased cancer risk. The implications of a positive or negative test result were discussed.  We also discussed the importance of testing an affected relative and how a negative result for Ms. Hurst wouldn't necessarily mean the cancers presenting in her family weren't due to a genetic mutation that Ms. Hurst did not inherit.  In general, a negative genetic test result is most informative if a mutation has first been established in an affected member of the family.  In cases where an affected individual is not available or interested in testing, it is appropriate to offer testing to an unaffected individual.      We discussed the possibility that, in some cases, genetic test results may be ambiguous due to the identification of a genetic variant of uncertain significance (VUS). These variants may or may not be associated with an increased cancer risk. With multigene panel testing, it is not uncommon for a VUS to be identified.  If a VUS is identified, testing family members is typically not recommended and screening recommendations are made based on the family history.  The laboratories that perform genetic testing work to reclassify the VUS and send out an amended report if and when a VUS is reclassified.  The majority of variant findings are ultimately reclassified to a negative result. Given Ms. Hurst's family history, a negative test result does not eliminate all cancer risk, although the risk may not be as high as it would with positive genetic testing.     TEST RESULTS:  Genetic testing was negative for known pathogenic mutations by sequencing, rearrangement  testing, and RNA analysis for the 77 genes included on the CancerNext-Expanded panel.  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 However the majority of VUS's are ultimately reclassified as benign, therefore this result should not be used in making management decisions at this time. If this variant is ever reclassified a new report will be issued by the laboratory and released directly to the ordering physician, and our office. This assessment is based on the information provided at the time of consultation and could change should new information be obtained regarding her personal and/or family history.     CANCER PREVENTION: At this time, Ms. Martin management should be guided by a family history-based risk assessment. miCaber-weave energyzick, version 8 is able to take into account personal factors (age at menarche, age at first live birth, breast density, etc) and family history when calculating risk for breast cancer.  Computer modeling estimates that Ms. Martin lifetime personal risk for developing breast cancer is up to 9.9% (Jonathoner-Jasminezick, v8), compared to the average 36 y.o. female's risk of 11.0%. In general, a lifetime risk above 20% is considered to be “high risk” where increased screening is warranted; Ms. Martin risk does not fall into that category. This risk assessment is based on the family history information provided at the time of the appointment and could change in the future should new information be obtained.    Options available to individuals with an elevated lifetime risk for breast and/or ovarian cancer were briefly discussed.  Based on computer modeling, Ms. Martin lifetime risk for breast cancer would not be considered “high risk” (>20%).   Per NCCN guidelines, it is appropriate for her to follow general population screening guidelines for her breast cancer risk including annual clinical breast exam and annual mammography. This assessment is based on the  information provided at the time of the consultation and could change should new information become available regarding the family history or if Ms. Hurst were to test positive for a mutation in a cancer susceptibility gene.    PLAN:  Genetic counseling remains available to Ms. Hurst and her family.  Ms. Hurst is welcome to contact us with any questions in the future at [phone].     Sofie Castro, MS, [facility], [facility]  Licensed Certified Genetic Counselor    Cc: Iris Haywood APRN

## 2025-05-21 DIAGNOSIS — F90.2 ATTENTION DEFICIT HYPERACTIVITY DISORDER (ADHD), COMBINED TYPE: ICD-10-CM

## 2025-05-21 RX ORDER — LISDEXAMFETAMINE DIMESYLATE 50 MG/1
50 CAPSULE ORAL EVERY MORNING
Qty: 30 CAPSULE | Refills: 0 | Status: SHIPPED | OUTPATIENT
Start: 2025-05-21 | End: 2025-06-20

## 2025-05-21 NOTE — TELEPHONE ENCOUNTER
Caller: Myrtle Hurst    Relationship: Self    Best call back number:     Requested Prescriptions:   Requested Prescriptions     Pending Prescriptions Disp Refills    lisdexamfetamine (Vyvanse) 50 MG capsule 30 capsule 0     Sig: Take 1 capsule by mouth Every Morning for 30 days        Pharmacy where request should be sent: [facility]ACY #[id number] - [place], [place] - [address] AT ACROSS FROM [facility] [phone] [facility] [phone]      Last office visit with prescribing clinician: 4/18/2025   Last telemedicine visit with prescribing clinician: Visit date not found   Next office visit with prescribing clinician: 6/16/2025     Additional details provided by patient:     Does the patient have less than a 3 day supply:  [x] Yes  [] No    Would you like a call back once the refill request has been completed: [] Yes [] No    If the office needs to give you a call back, can they leave a voicemail: [] Yes [] No    Kimberly Parsons Rep   05/21/25 11:46 CDT

## 2025-05-21 NOTE — TELEPHONE ENCOUNTER
Rx Refill Note  Requested Prescriptions     Pending Prescriptions Disp Refills    lisdexamfetamine (Vyvanse) 50 MG capsule 30 capsule 0     Sig: Take 1 capsule by mouth Every Morning for 30 days      Last office visit with prescribing clinician: 4/18/2025   Last telemedicine visit with prescribing clinician: Visit date not found   Next office visit with prescribing clinician: 6/16/2025    2nd refill.    CSA current.  [employer] message sent to patient to have UDS completed to be UTD.       Shi Moraes MA  05/21/25, 13:05 CDT

## 2025-06-03 ENCOUNTER — TELEPHONE (OUTPATIENT)
Dept: OBSTETRICS AND GYNECOLOGY | Age: 35
End: 2025-06-03

## 2025-06-03 NOTE — TELEPHONE ENCOUNTER
Caller: Myrtle Hurst    Relationship:  Self    Best call back number: [phone]    PATIENT CALLED REQUESTING TO CANCEL SAME DAY APPT.    Did the patient call AFTER the start time of their scheduled appointment?  []YES  [x]NO    Was the patient's appointment rescheduled? [x]YES  []NO    Any additional information:

## 2025-07-02 DIAGNOSIS — F90.2 ATTENTION DEFICIT HYPERACTIVITY DISORDER (ADHD), COMBINED TYPE: ICD-10-CM

## 2025-07-02 RX ORDER — LISDEXAMFETAMINE DIMESYLATE 50 MG/1
50 CAPSULE ORAL EVERY MORNING
Qty: 30 CAPSULE | Refills: 0 | Status: SHIPPED | OUTPATIENT
Start: 2025-07-02 | End: 2025-08-01

## 2025-07-02 NOTE — TELEPHONE ENCOUNTER
Pt is due for 2nd fill on Vyvanse.  Pt was last seen on 4-18-25 and okayed for a 3 month f/u.  CSA is up to date ( 3-18-25). Last UDS completed on 9-21-23 and was appropriate.  Routing to Dr. Parker for approval. Sent a [employer] message regarding refill request.

## 2025-07-02 NOTE — TELEPHONE ENCOUNTER
Caller: Myrtle Hurst    Relationship: Self    Best call back number: [phone]    Requested Prescriptions:   Requested Prescriptions     Pending Prescriptions Disp Refills    lisdexamfetamine (Vyvanse) 50 MG capsule 30 capsule 0     Sig: Take 1 capsule by mouth Every Morning for 30 days        Pharmacy where request should be sent: [facility]/PHARMACY #[id number] - [place], [place] - [address] AT ACROSS FROM [facility] [phone] [facility] [phone]      Last office visit with prescribing clinician: 4/18/2025   Last telemedicine visit with prescribing clinician: Visit date not found   Next office visit with prescribing clinician: Visit date not found         Does the patient have less than a 3 day supply:  [x] Yes  [] No    Would you like a call back once the refill request has been completed: [] Yes [x] No        Kimberly Benavides Rep   07/02/25 14:45 CDT

## 2025-08-01 DIAGNOSIS — F90.2 ATTENTION DEFICIT HYPERACTIVITY DISORDER (ADHD), COMBINED TYPE: ICD-10-CM

## 2025-08-01 RX ORDER — LISDEXAMFETAMINE DIMESYLATE 50 MG/1
50 CAPSULE ORAL EVERY MORNING
Qty: 30 CAPSULE | Refills: 0 | Status: SHIPPED | OUTPATIENT
Start: 2025-08-01 | End: 2025-08-31

## 2025-08-01 NOTE — TELEPHONE ENCOUNTER
Caller: Hurst Myrtle    Relationship: Self    Best call back number:  [phone]      Requested Prescriptions     Pending Prescriptions Disp Refills    lisdexamfetamine (Vyvanse) 50 MG capsule 30 capsule 0     Sig: Take 1 capsule by mouth Every Morning for 30 days        Pharmacy where request should be sent: [facility]/PHARMACY #[id number] - [place], [place] - [address] AT ACROSS FROM [facility] [phone] [facility] [phone]      Last office visit with prescribing clinician: 4/18/2025   Last telemedicine visit with prescribing clinician: Visit date not found   Next office visit with prescribing clinician: Visit date not found     Additional details provided by patient:     2 LEFT    Does the patient have less than a 3 day supply:  [x] Yes  [] No    Would you like a call back once the refill request has been completed: [] Yes [] No    If the office needs to give you a call back, can they leave a voicemail: [] Yes [] No    Kimberly Winters Rep   08/01/25 11:15 CDT

## 2025-08-01 NOTE — TELEPHONE ENCOUNTER
Rx Refill Note  Requested Prescriptions     Pending Prescriptions Disp Refills    lisdexamfetamine (Vyvanse) 50 MG capsule 30 capsule 0     Sig: Take 1 capsule by mouth Every Morning for 30 days    3RD RX   Last office visit with prescribing clinician: 4/18/2025   Last telemedicine visit with prescribing clinician: Visit date not found   Next office visit with prescribing clinician: 8/5/2025                     {TIP  Is Refill Pharmacy correct?:23    Nat Ro MA  08/01/25, 11:39 CDT

## 2025-08-13 RX ORDER — BUSPIRONE HYDROCHLORIDE 10 MG/1
10 TABLET ORAL 3 TIMES DAILY PRN
Qty: 90 TABLET | Refills: 1 | Status: SHIPPED | OUTPATIENT
Start: 2025-08-13